# Patient Record
Sex: MALE | Race: WHITE | NOT HISPANIC OR LATINO | Employment: OTHER | URBAN - METROPOLITAN AREA
[De-identification: names, ages, dates, MRNs, and addresses within clinical notes are randomized per-mention and may not be internally consistent; named-entity substitution may affect disease eponyms.]

---

## 2017-04-04 ENCOUNTER — HOSPITAL ENCOUNTER (EMERGENCY)
Facility: HOSPITAL | Age: 72
Discharge: HOME | End: 2017-04-04
Attending: EMERGENCY MEDICINE
Payer: MEDICARE

## 2017-04-04 VITALS
HEART RATE: 99 BPM | HEIGHT: 70 IN | SYSTOLIC BLOOD PRESSURE: 146 MMHG | WEIGHT: 205 LBS | BODY MASS INDEX: 29.35 KG/M2 | RESPIRATION RATE: 15 BRPM | DIASTOLIC BLOOD PRESSURE: 85 MMHG | TEMPERATURE: 98 F | OXYGEN SATURATION: 95 %

## 2017-04-04 DIAGNOSIS — R06.6 HICCUPS: Primary | ICD-10-CM

## 2017-04-04 LAB
ALBUMIN SERPL BCP-MCNC: 3.6 G/DL
ALP SERPL-CCNC: 48 U/L
ALT SERPL W/O P-5'-P-CCNC: 28 U/L
ANION GAP SERPL CALC-SCNC: 12 MMOL/L
AST SERPL-CCNC: 20 U/L
BASOPHILS # BLD AUTO: 0.01 K/UL
BASOPHILS NFR BLD: 0.2 %
BILIRUB SERPL-MCNC: 0.7 MG/DL
BILIRUB UR QL STRIP: NEGATIVE
BNP SERPL-MCNC: 213 PG/ML
BUN SERPL-MCNC: 8 MG/DL
CALCIUM SERPL-MCNC: 9.7 MG/DL
CHLORIDE SERPL-SCNC: 100 MMOL/L
CK SERPL-CCNC: 151 U/L
CLARITY UR REFRACT.AUTO: CLEAR
CO2 SERPL-SCNC: 25 MMOL/L
COLOR UR AUTO: YELLOW
CREAT SERPL-MCNC: 1 MG/DL
DIFFERENTIAL METHOD: ABNORMAL
EOSINOPHIL # BLD AUTO: 0.2 K/UL
EOSINOPHIL NFR BLD: 2.8 %
ERYTHROCYTE [DISTWIDTH] IN BLOOD BY AUTOMATED COUNT: 13.6 %
EST. GFR  (AFRICAN AMERICAN): >60 ML/MIN/1.73 M^2
EST. GFR  (NON AFRICAN AMERICAN): >60 ML/MIN/1.73 M^2
GLUCOSE SERPL-MCNC: 169 MG/DL
GLUCOSE UR QL STRIP: NEGATIVE
HCT VFR BLD AUTO: 42.7 %
HGB BLD-MCNC: 14 G/DL
HGB UR QL STRIP: NEGATIVE
KETONES UR QL STRIP: NEGATIVE
LEUKOCYTE ESTERASE UR QL STRIP: NEGATIVE
LYMPHOCYTES # BLD AUTO: 0.5 K/UL
LYMPHOCYTES NFR BLD: 9.8 %
MCH RBC QN AUTO: 29.9 PG
MCHC RBC AUTO-ENTMCNC: 32.8 %
MCV RBC AUTO: 91 FL
MONOCYTES # BLD AUTO: 0.6 K/UL
MONOCYTES NFR BLD: 10.4 %
NEUTROPHILS # BLD AUTO: 4.2 K/UL
NEUTROPHILS NFR BLD: 76.6 %
NITRITE UR QL STRIP: NEGATIVE
PH UR STRIP: 7 [PH] (ref 5–8)
PLATELET # BLD AUTO: 118 K/UL
PMV BLD AUTO: 12.1 FL
POTASSIUM SERPL-SCNC: 4 MMOL/L
PROT SERPL-MCNC: 7 G/DL
PROT UR QL STRIP: NEGATIVE
RBC # BLD AUTO: 4.69 M/UL
SODIUM SERPL-SCNC: 137 MMOL/L
SP GR UR STRIP: <=1.005 (ref 1–1.03)
TROPONIN I SERPL DL<=0.01 NG/ML-MCNC: <0.006 NG/ML
URN SPEC COLLECT METH UR: ABNORMAL
UROBILINOGEN UR STRIP-ACNC: ABNORMAL EU/DL
WBC # BLD AUTO: 5.41 K/UL

## 2017-04-04 PROCEDURE — 25000003 PHARM REV CODE 250: Performed by: EMERGENCY MEDICINE

## 2017-04-04 PROCEDURE — 63600175 PHARM REV CODE 636 W HCPCS: Performed by: EMERGENCY MEDICINE

## 2017-04-04 PROCEDURE — 83880 ASSAY OF NATRIURETIC PEPTIDE: CPT

## 2017-04-04 PROCEDURE — 84484 ASSAY OF TROPONIN QUANT: CPT

## 2017-04-04 PROCEDURE — 85025 COMPLETE CBC W/AUTO DIFF WBC: CPT

## 2017-04-04 PROCEDURE — 93010 ELECTROCARDIOGRAM REPORT: CPT | Mod: ,,, | Performed by: NUCLEAR MEDICINE

## 2017-04-04 PROCEDURE — 82550 ASSAY OF CK (CPK): CPT

## 2017-04-04 PROCEDURE — 96374 THER/PROPH/DIAG INJ IV PUSH: CPT

## 2017-04-04 PROCEDURE — 80053 COMPREHEN METABOLIC PANEL: CPT

## 2017-04-04 PROCEDURE — 99284 EMERGENCY DEPT VISIT MOD MDM: CPT | Mod: 25

## 2017-04-04 PROCEDURE — 99900035 HC TECH TIME PER 15 MIN (STAT): Performed by: GENERAL PRACTICE

## 2017-04-04 PROCEDURE — 93005 ELECTROCARDIOGRAM TRACING: CPT | Performed by: GENERAL PRACTICE

## 2017-04-04 PROCEDURE — 81003 URINALYSIS AUTO W/O SCOPE: CPT

## 2017-04-04 RX ORDER — TAMSULOSIN HYDROCHLORIDE 0.4 MG/1
0.4 CAPSULE ORAL DAILY
COMMUNITY
Start: 2017-04-01 | End: 2018-06-02

## 2017-04-04 RX ORDER — CHLORPROMAZINE HYDROCHLORIDE 25 MG/1
25 TABLET, FILM COATED ORAL
Status: COMPLETED | OUTPATIENT
Start: 2017-04-04 | End: 2017-04-04

## 2017-04-04 RX ORDER — METOCLOPRAMIDE HYDROCHLORIDE 5 MG/ML
5 INJECTION INTRAMUSCULAR; INTRAVENOUS
Status: COMPLETED | OUTPATIENT
Start: 2017-04-04 | End: 2017-04-04

## 2017-04-04 RX ORDER — CHLORPROMAZINE HYDROCHLORIDE 10 MG/1
10 TABLET, FILM COATED ORAL 3 TIMES DAILY
Qty: 30 TABLET | Refills: 0 | Status: SHIPPED | OUTPATIENT
Start: 2017-04-04 | End: 2017-04-06

## 2017-04-04 RX ORDER — HYDROCODONE BITARTRATE AND ACETAMINOPHEN 7.5; 325 MG/1; MG/1
1 TABLET ORAL EVERY 6 HOURS PRN
COMMUNITY
Start: 2017-04-01 | End: 2017-04-06

## 2017-04-04 RX ORDER — FAMOTIDINE 20 MG/1
20 TABLET, FILM COATED ORAL DAILY
COMMUNITY

## 2017-04-04 RX ORDER — IPRATROPIUM BROMIDE AND ALBUTEROL SULFATE 2.5; .5 MG/3ML; MG/3ML
3 SOLUTION RESPIRATORY (INHALATION) EVERY 6 HOURS PRN
Status: ON HOLD | COMMUNITY
End: 2018-06-18 | Stop reason: CLARIF

## 2017-04-04 RX ORDER — METHYLDOPA 250 MG/1
250 TABLET, FILM COATED ORAL EVERY 12 HOURS
COMMUNITY

## 2017-04-04 RX ORDER — BUDESONIDE AND FORMOTEROL FUMARATE DIHYDRATE 160; 4.5 UG/1; UG/1
2 AEROSOL RESPIRATORY (INHALATION) EVERY 12 HOURS
COMMUNITY

## 2017-04-04 RX ADMIN — CHLORPROMAZINE HYDROCHLORIDE 25 MG: 25 TABLET, SUGAR COATED ORAL at 01:04

## 2017-04-04 RX ADMIN — METOCLOPRAMIDE 5 MG: 5 INJECTION, SOLUTION INTRAMUSCULAR; INTRAVENOUS at 02:04

## 2017-04-04 NOTE — ED NOTES
Pt is feeling better & feels comfortable being discharged at this time. Dr. Guerra is aware of vital signs & states ok for d/c. Pt is stable, in NAD, RR equal & unlabored, VSS, denies pain. Pt & spouse deny any further needs, questions, concerns or complaints. Will d/c per MD order.

## 2017-04-04 NOTE — DISCHARGE INSTRUCTIONS
Hiccups  The diaphragm is a dome-shaped muscle located at the bottom of the chest. It is the main muscle used for breathing. When you inhale, it pulls down to draw air into the lungs. When you exhale, the muscle pushes up to push air out of the lungs.  A hiccup is a spasm of the diaphragm muscle. This causes you to quickly inhale air, causing the hiccup sound. This may occur after eating or drinking too quickly or too much or having an irritation in the stomach or throat. It may also occur when feeling nervous or excited. Often hiccups seem to happen for no clear reason.  In most cases, hiccups are not serious. They last just a few minutes, and often go away without any treatment. There are many home remedies for treating hiccups and sometimes they work. These include:  · Holding your breath while counting to 10  · Drinking a glass of water quickly  · Rubbing the back of your tongue with a finger (just short of gagging)  · Making yourself sneeze  · Putting sugar on the back of your tongue  If the hiccups continue, medicine may be needed.  In rare cases hiccups may last for days or weeks, but this is very unusual. When this occurs, it is often the sign of another medical problem. In these cases, tests may be done to help find the cause.  Home care  · If your healthcare provider has prescribed medicine, be sure to take it as directed.  · Try the home remedies mentioned above. If using sugar, place ½ teaspoon of dry sugar and let it dissolve on the back of your tongue. If needed, repeat this process 3 times at 2-minute intervals. People who have diabetes should not use this home remedy.  Follow-up care  Follow up with your healthcare provider, or as directed. If testing was done, youll be told the results and whether there are any new findings that affect your care.  When to seek medical advice  Call your healthcare provider right away if any of these occur:  · Hiccups continue for more than 3 hours  · Hiccups  affect sleeping or keep you from eating  · Abdominal pain  Call 911  Call 911 right away if any of these occur:  · Trouble breathing or swallowing  · Unusually fast heart rate  · Fainting  · Vomiting blood  Date Last Reviewed: 6/22/2015  © 8704-6752 [employer]. [address] [place] [address]. All rights reserved. This information is not intended as a substitute for professional medical care. Always follow your healthcare professional's instructions.

## 2017-04-04 NOTE — ED PROVIDER NOTES
Encounter Date: 4/4/2017       History     Chief Complaint   Patient presents with    Shortness of Breath     Pt reports having outpt hernia repair on 3/31. States having hard time catching his breath since then. HX COPD, home treatments not helping.      Review of patient's allergies indicates:   Allergen Reactions    Losartan-hydrochlorothiazide Shortness Of Breath     Angioedema    Ace inhibitors      Facial swelling    Amlodipine      swelling    Azithromycin      diarrhea    Nebivolol      Patient is a 71 y.o. male presenting with the following complaint: shortness of breath. The history is provided by the patient.   Shortness of Breath   This is a new problem. The average episode lasts 3 days. The problem occurs frequently.The problem has not changed since onset.Pertinent negatives include no fever, no sore throat, no cough, no sputum production, no hemoptysis, no wheezing, no PND, no orthopnea, no chest pain, no abdominal pain, no rash, no leg pain, no leg swelling and no claudication. The problem's precipitants include medical treatment. He has tried nothing for the symptoms. Associated medical issues include COPD.     Past Medical History:   Diagnosis Date    Angio-edema     Dougherty esophagus     COPD (chronic obstructive pulmonary disease)     GERD (gastroesophageal reflux disease)     Hyperlipemia     Hypertension     PLMD (periodic limb movement disorder)     Sensorineural deafness     Stroke     TIA (transient ischemic attack)      Past Surgical History:   Procedure Laterality Date    CYST REMOVAL      right facial cheek     HERNIA REPAIR       History reviewed. No pertinent family history.  Social History   Substance Use Topics    Smoking status: Former Smoker     Packs/day: 2.00     Years: 35.00     Types: Cigarettes     Quit date: 2/18/1978    Smokeless tobacco: Former User    Alcohol use No     Review of Systems   Constitutional: Negative for fever.   HENT: Negative for sore  throat.    Respiratory: Positive for shortness of breath. Negative for cough, hemoptysis, sputum production and wheezing.    Cardiovascular: Negative for chest pain, orthopnea, claudication, leg swelling and PND.   Gastrointestinal: Negative for abdominal pain and nausea.   Genitourinary: Negative for dysuria.   Musculoskeletal: Negative for back pain.   Skin: Negative for rash.   Neurological: Negative for weakness.   Hematological: Does not bruise/bleed easily.       Physical Exam   Initial Vitals   BP Pulse Resp Temp SpO2   04/04/17 1256 04/04/17 1256 04/04/17 1256 04/04/17 1256 04/04/17 1256   161/82 101 20 97.8 °F (36.6 °C) 95 %     Physical Exam    Constitutional: He appears well-developed and well-nourished. No distress.   HENT:   Head: Normocephalic and atraumatic.   Eyes: Conjunctivae are normal. Pupils are equal, round, and reactive to light.   Neck: Normal range of motion. Neck supple.   Cardiovascular: Normal rate, regular rhythm and normal heart sounds.   Pulmonary/Chest: Breath sounds normal. He has no wheezes. He has no rhonchi. He has no rales.   Abdominal: Soft. Bowel sounds are normal. He exhibits no distension and no fluid wave. There is no tenderness.       Musculoskeletal: Normal range of motion.   Neurological: He is alert and oriented to person, place, and time. No cranial nerve deficit.   Skin: Skin is warm and dry.   Psychiatric: He has a normal mood and affect.         ED Course   Procedures  Labs Reviewed   CBC W/ AUTO DIFFERENTIAL - Abnormal; Notable for the following:        Result Value    Platelets 118 (*)     Lymph # 0.5 (*)     Gran% 76.6 (*)     Lymph% 9.8 (*)     All other components within normal limits   COMPREHENSIVE METABOLIC PANEL - Abnormal; Notable for the following:     Glucose 169 (*)     Alkaline Phosphatase 48 (*)     All other components within normal limits   URINALYSIS - Abnormal; Notable for the following:     Urobilinogen, UA 2.0-3.0 (*)     All other components  "within normal limits   B-TYPE NATRIURETIC PEPTIDE - Abnormal; Notable for the following:      (*)     All other components within normal limits   CK   TROPONIN I     EKG Readings: (Independently Interpreted)   Rhythm: Sinus Arrhythmia. Heart Rate: 91. Ectopy: No Ectopy. Conduction: LBBB.     ED Vital Signs:  Vitals:    04/04/17 1256 04/04/17 1305 04/04/17 1351   BP: (!) 161/82  138/78   Pulse: 101 97 100   Resp: 20  18   Temp: 97.8 °F (36.6 °C)     TempSrc: Oral     SpO2: 95%  95%   Weight: 93 kg (205 lb)     Height: 5' 10" (1.778 m)           Abnormal Lab Results:  Labs Reviewed   CBC W/ AUTO DIFFERENTIAL - Abnormal; Notable for the following:        Result Value    Platelets 118 (*)     Lymph # 0.5 (*)     Gran% 76.6 (*)     Lymph% 9.8 (*)     All other components within normal limits   COMPREHENSIVE METABOLIC PANEL - Abnormal; Notable for the following:     Glucose 169 (*)     Alkaline Phosphatase 48 (*)     All other components within normal limits   URINALYSIS - Abnormal; Notable for the following:     Urobilinogen, UA 2.0-3.0 (*)     All other components within normal limits   B-TYPE NATRIURETIC PEPTIDE - Abnormal; Notable for the following:      (*)     All other components within normal limits   CK   TROPONIN I          All Lab Results:  Results for orders placed or performed during the hospital encounter of 04/04/17   CBC auto differential   Result Value Ref Range    WBC 5.41 3.90 - 12.70 K/uL    RBC 4.69 4.60 - 6.20 M/uL    Hemoglobin 14.0 14.0 - 18.0 g/dL    Hematocrit 42.7 40.0 - 54.0 %    MCV 91 82 - 98 fL    MCH 29.9 27.0 - 31.0 pg    MCHC 32.8 32.0 - 36.0 %    RDW 13.6 11.5 - 14.5 %    Platelets 118 (L) 150 - 350 K/uL    MPV 12.1 9.2 - 12.9 fL    Gran # 4.2 1.8 - 7.7 K/uL    Lymph # 0.5 (L) 1.0 - 4.8 K/uL    Mono # 0.6 0.3 - 1.0 K/uL    Eos # 0.2 0.0 - 0.5 K/uL    Baso # 0.01 0.00 - 0.20 K/uL    Gran% 76.6 (H) 38.0 - 73.0 %    Lymph% 9.8 (L) 18.0 - 48.0 %    Mono% 10.4 4.0 - 15.0 %    " Eosinophil% 2.8 0.0 - 8.0 %    Basophil% 0.2 0.0 - 1.9 %    Differential Method Automated    Comprehensive metabolic panel   Result Value Ref Range    Sodium 137 136 - 145 mmol/L    Potassium 4.0 3.5 - 5.1 mmol/L    Chloride 100 95 - 110 mmol/L    CO2 25 23 - 29 mmol/L    Glucose 169 (H) 70 - 110 mg/dL    BUN, Bld 8 8 - 23 mg/dL    Creatinine 1.0 0.5 - 1.4 mg/dL    Calcium 9.7 8.7 - 10.5 mg/dL    Total Protein 7.0 6.0 - 8.4 g/dL    Albumin 3.6 3.5 - 5.2 g/dL    Total Bilirubin 0.7 0.1 - 1.0 mg/dL    Alkaline Phosphatase 48 (L) 55 - 135 U/L    AST 20 10 - 40 U/L    ALT 28 10 - 44 U/L    Anion Gap 12 8 - 16 mmol/L    eGFR if African American >60.0 >60 mL/min/1.73 m^2    eGFR if non African American >60.0 >60 mL/min/1.73 m^2   Urinalysis   Result Value Ref Range    Specimen UA Urine, Clean Catch     Color, UA Yellow Yellow, Straw, Essence    Appearance, UA Clear Clear    pH, UA 7.0 5.0 - 8.0    Specific Gravity, UA <=1.005 1.005 - 1.030    Protein, UA Negative Negative    Glucose, UA Negative Negative    Ketones, UA Negative Negative    Bilirubin (UA) Negative Negative    Occult Blood UA Negative Negative    Nitrite, UA Negative Negative    Urobilinogen, UA 2.0-3.0 (A) <2.0 EU/dL    Leukocytes, UA Negative Negative   Brain natriuretic peptide   Result Value Ref Range     (H) 0 - 99 pg/mL   CK   Result Value Ref Range     20 - 200 U/L   Troponin I   Result Value Ref Range    Troponin I <0.006 0.000 - 0.026 ng/mL           Imaging Results:  Imaging Results         X-Ray Chest AP Portable (Final result) Result time:  04/04/17 13:22:20    Final result by ARLENE Yeboah Sr., MD (04/04/17 13:22:20)    Impression:        1.  The lungs are clear.  2.  There is moderate cardiomegaly.  3.  The left costophrenic angle is not well seen.  A tiny left pleural effusion cannot be excluded.        Electronically signed by: ARLENE YEBOAH MD  Date:     04/04/17  Time:    13:22     Narrative:    One-view chest x-ray    Clinical  History: Shortness of breath    Finding: Comparison was made to a prior examination performed on 7/12/2016.  There is moderate cardiomegaly.  The lungs are clear. There is no pneumothorax.  The right costophrenic angle is sharp.  The left costophrenic angle is not well seen.                 The Emergency Provider reviewed the vital signs and test results, which are outlined above.    ED Discussions:  2:13 PM: Re-evaluated pt. Pt is resting comfortably and is in no acute distress.  Pt states that the hiccups are gone, and he feels much better.  No longer short of breath.  D/w pt all pertinent results. D/w pt any concerns expressed at this time. Answered all questions. Pt expresses understanding at this time.    2:43 PM: Reassessed pt at this time.  Pt states his condition has improved at this time.  Repeat abdominal exam - soft nontender, nondistended.   Discussed with pt all pertinent ED information and results. Discussed pt dx of hiccups and plan of tx. Gave pt all f/u and return to the ED instructions. All questions and concerns were addressed at this time. Pt expresses understanding of information and instructions, and is comfortable with plan to discharge. Pt is stable for discharge.                               ED Course     Clinical Impression:       ICD-10-CM ICD-9-CM   1. Hiccups R06.6 786.8         Disposition:   Disposition: Discharged  Condition: Stable       Phil Guerra MD  04/04/17 3407

## 2017-04-04 NOTE — ED NOTES
"72 y/o M presents to ED with c/o "hard time catching his breath since having surgery on Friday." He does admit to HX of COPD, but reports using rescue inhaler as well as home duo-neb treatments x2 with no relief.     LOC: The patient is awake, alert and aware of environment with an appropriate affect, the patient is oriented x 3 and speaking appropriately.  APPEARANCE: Patient resting comfortably and in no acute distress, patient is clean and well groomed, patient's clothing is properly fastened.  HEENT: Brief WNL  SKIN: Brief WNL except 3 laparoscopic incision sites to lower mid-line abdomen. Dressings C/D/I. Dressed with band-aid & steri-strips. No drainage noted.   MUSCULOSKELETAL: Brief WNL  RESPIRATORY: Brief WNL except pt reports being short of breath since Friday. BBS clear & equal to auscultation. O2 saturations 95-97% on RA. RR equal & unlabored. No distress noted. No use of accessory muscles.   CARDIAC: Brief WNL. Pt denies CP. 12 Lead EKG reveals sinus arrythmia. Pulses are strong & regular throughout.   GASTRO: Brief WNL except pt c/o mild constipation. States his first BM post-op was this AM. Abdomen is soft & non-distended. Bowel sounds audible, but hypoactive in all 4 quadrants. Pt denies nausea & vomiting. He does c/o incessant hiccoughs/belching that began last PM.   : Brief WNL  Peripheral Vasc: Brief WNL  NEURO: Brief WNL  PSYCH: Brief WNL    Pt & wife have been updated on plan of care and verbalize understanding. Bed is locked & in lowest position. Side rails up x2 & call bell is within pt reach. Pt & spouse have been instructed to call with any questions, concerns, needs or complaints. Will continue to monitor.   "

## 2017-04-04 NOTE — ED AVS SNAPSHOT
[facility]  [address] [address]  [place] [address]               Jose Clark Jr.   2017 12:47 PM   ED    Description:  Male : 1945   Department:  [facility]           Your Care was Coordinated By:     Provider Role From To    Phil Guerra MD Attending Provider 17 1251 --      Reason for Visit     Shortness of Breath           Diagnoses this Visit        Comments    Hiccups    -  Primary       ED Disposition     ED Disposition Condition Comment    Discharge             To Do List           Follow-up Information     Follow up with Rudolph Alves MD In 2 days.    Specialty:  Internal Medicine    Contact information:    [address] [facility]  [place] [address]  [phone]         These Medications        Disp Refills Start End    chlorproMAZINE (THORAZINE) 10 MG tablet 30 tablet 0 2017    Take 1 tablet (10 mg total) by mouth 3 (three) times daily. - Oral    Pharmacy: [facility] #[id number] - [place] - [address] Ph #: [phone]         [facility] On [facility]     [facility] Nurse Care Line -  Assistance  Unless otherwise directed by your provider, please contact [facility], our nurse care line that is available for  assistance.     Registered nurses in the [facility] provide: appointment scheduling, clinical advisement, health education, and other advisory services.  Call: [phone] (toll free)               Medications           Message regarding Medications     Verify the changes and/or additions to your medication regime listed below are the same as discussed with your clinician today.  If any of these changes or additions are incorrect, please notify your healthcare provider.        START taking these NEW medications        Refills    chlorproMAZINE (THORAZINE) 10 MG tablet 0    Sig: Take 1 tablet (10 mg total) by mouth 3 (three) times daily.    Class: Print     Route: Oral      These medications were administered today        Dose Freq    chlorproMAZINE tablet 25 mg 25 mg ED 1 Time    Sig: Take 1 tablet (25 mg total) by mouth ED 1 Time.    Class: Normal    Route: Oral    metoclopramide HCl injection 5 mg 5 mg ED 1 Time    Sig: Inject 1 mL (5 mg total) into the vein ED 1 Time.    Class: Normal    Route: Intravenous      STOP taking these medications     albuterol 90 mcg/actuation inhaler Inhale 2 puffs into the lungs every 6 (six) hours as needed.    budesonide-formoterol 80-4.5 mcg (SYMBICORT) 80-4.5 mcg/actuation HFAA Inhale 2 puffs into the lungs 2 (two) times daily.    diazepam (VALIUM) 5 MG tablet Take 1 tablet (5 mg total) by mouth every 6 (six) hours as needed for Anxiety (hiccups).    indapamide (LOZOL) 2.5 MG Tab Take 2.5 mg by mouth.           Verify that the below list of medications is an accurate representation of the medications you are currently taking.  If none reported, the list may be blank. If incorrect, please contact your healthcare provider. Carry this list with you in case of emergency.           Current Medications     albuterol sulfate 90 mcg/actuation AePB Inhale into the lungs. Rescue    albuterol-ipratropium 2.5mg-0.5mg/3mL (DUONEB) 0.5 mg-3 mg(2.5 mg base)/3 mL nebulizer solution Take 3 mLs by nebulization every 6 (six) hours as needed for Wheezing. Rescue    aspirin (ECOTRIN) 81 MG EC tablet Take 81 mg by mouth once daily.    budesonide-formoterol 160-4.5 mcg (SYMBICORT) 160-4.5 mcg/actuation HFAA Inhale 2 puffs into the lungs every 12 (twelve) hours. Controller    ergocalciferol, vitamin D2, 400 unit Tab Take 1 tablet by mouth once daily.    esomeprazole (NEXIUM) 40 MG capsule Take 40 mg by mouth.    famotidine (PEPCID) 20 MG tablet Take 20 mg by mouth once daily.    hydrocodone-acetaminophen 7.5-325mg (NORCO) 7.5-325 mg per tablet Take 1 tablet by mouth every 6 (six) hours as needed.    methyldopa (ALDOMET) 250 MG tablet Take 250 mg by mouth  "every 12 (twelve) hours.    simvastatin (ZOCOR) 40 MG tablet Take 40 mg by mouth every evening.    tamsulosin (FLOMAX) 0.4 mg Cp24 Take 0.4 mg by mouth once daily.    terazosin (HYTRIN) 1 MG capsule Take 1 mg by mouth every evening.    chlorproMAZINE (THORAZINE) 10 MG tablet Take 1 tablet (10 mg total) by mouth 3 (three) times daily.    chlorproMAZINE tablet 25 mg Take 1 tablet (25 mg total) by mouth ED 1 Time.    epinephrine (EPIPEN) 0.3 mg/0.3 mL (1:1,000) AtIn Inject 0.3 mLs (0.3 mg total) into the muscle as needed.           Clinical Reference Information           Your Vitals Were     BP Pulse Temp Resp Height Weight    138/78 (BP Location: Left arm, Patient Position: Lying, BP Method: Automatic) 100 97.8 °F (36.6 °C) (Oral) 18 5' 10" (1.778 m) 93 kg (205 lb)    SpO2 BMI             95% 29.41 kg/m2         Allergies as of 4/4/2017        Reactions    Losartan-hydrochlorothiazide Shortness Of Breath    Angioedema    Ace Inhibitors     Facial swelling    Amlodipine     swelling    Azithromycin     diarrhea    Nebivolol       Immunizations Administered on Date of Encounter - 4/4/2017     None      ED Micro, Lab, POCT     Start Ordered       Status Ordering Provider    04/04/17 1253 04/04/17 1254  CBC auto differential  STAT      Final result     04/04/17 1253 04/04/17 1254  Comprehensive metabolic panel  STAT      Final result     04/04/17 1253 04/04/17 1254  Urinalysis  STAT      Final result     04/04/17 1253 04/04/17 1254  Brain natriuretic peptide  STAT      Final result     04/04/17 1253 04/04/17 1254  CK  STAT      Final result     04/04/17 1253 04/04/17 1254  Troponin I  STAT      Final result       ED Imaging Orders     Start Ordered       Status Ordering Provider    04/04/17 1253 04/04/17 1254  X-Ray Chest AP Portable  1 time imaging      Final result         Discharge Instructions         Hiccups  The diaphragm is a dome-shaped muscle located at the bottom of the chest. It is the main muscle used for " breathing. When you inhale, it pulls down to draw air into the lungs. When you exhale, the muscle pushes up to push air out of the lungs.  A hiccup is a spasm of the diaphragm muscle. This causes you to quickly inhale air, causing the hiccup sound. This may occur after eating or drinking too quickly or too much or having an irritation in the stomach or throat. It may also occur when feeling nervous or excited. Often hiccups seem to happen for no clear reason.  In most cases, hiccups are not serious. They last just a few minutes, and often go away without any treatment. There are many home remedies for treating hiccups and sometimes they work. These include:  · Holding your breath while counting to 10  · Drinking a glass of water quickly  · Rubbing the back of your tongue with a finger (just short of gagging)  · Making yourself sneeze  · Putting sugar on the back of your tongue  If the hiccups continue, medicine may be needed.  In rare cases hiccups may last for days or weeks, but this is very unusual. When this occurs, it is often the sign of another medical problem. In these cases, tests may be done to help find the cause.  Home care  · If your healthcare provider has prescribed medicine, be sure to take it as directed.  · Try the home remedies mentioned above. If using sugar, place ½ teaspoon of dry sugar and let it dissolve on the back of your tongue. If needed, repeat this process 3 times at 2-minute intervals. People who have diabetes should not use this home remedy.  Follow-up care  Follow up with your healthcare provider, or as directed. If testing was done, youll be told the results and whether there are any new findings that affect your care.  When to seek medical advice  Call your healthcare provider right away if any of these occur:  · Hiccups continue for more than 3 hours  · Hiccups affect sleeping or keep you from eating  · Abdominal pain  Call 911  Call 911 right away if any of these  occur:  · Trouble breathing or swallowing  · Unusually fast heart rate  · Fainting  · Vomiting blood  Date Last Reviewed: 6/22/2015  © 9787-1214 [employer]. [address] [place] [address], [place] [address]. All rights reserved. This information is not intended as a substitute for professional medical care. Always follow your healthcare professional's instructions.          [facility] Sign-Up     Activating your [facility] account is as easy as 1-2-3!     1) Visit [email or web address], select Sign Up Now, enter this activation code and your date of birth, then select Next.  [email or web address]  Expires: 5/19/2017  2:41 PM      2) Create a username and password to use when you visit [facility] in the future and select a security question in case you lose your password and select Next.    3) Enter your e-mail address and click Sign Up!    Additional Information  If you have questions, please e-mail [email or web address] or call [phone] to talk to our [facility] staff. Remember, [facility] is NOT to be used for urgent needs. For medical emergencies, dial 911.         Smoking Cessation     If you would like to quit smoking:   You may be eligible for free services if you are a [place] resident and started smoking cigarettes before September 1, 1988.  Call the Smoking Cessation Trust ([facility]) toll free at (516) 456-3779 or (525) 636-6069.   Call 9-800-QUIT-NOW if you do not meet the above criteria.   Contact us via email: [email or web address]   View our website for more information: [email or web address]         [facility] complies with applicable Federal civil rights laws and does not discriminate on the basis of race, color, national origin, age, disability, or sex.        Language Assistance Services     ATTENTION: Language assistance services are available, free of charge. Please call [phone].      ATENCIÓN: Si habla español, tiene a nice disposición servicios gratuitos de asistencia  lingüística. [facility] [phone].     JAZMYN Ý: N?u b?n nói Ti?ng Vi?t, có các d?ch v? h? tr? ngôn ng? mi?n phí dành cho b?n. G?i s? [phone].

## 2017-04-04 NOTE — ED NOTES
Hiccoughs have resolved. Pt is resting comfortably on stretcher. Reports breathing is also better.

## 2017-04-06 ENCOUNTER — HOSPITAL ENCOUNTER (EMERGENCY)
Facility: HOSPITAL | Age: 72
Discharge: HOME | End: 2017-04-06
Payer: MEDICARE

## 2017-04-06 VITALS
BODY MASS INDEX: 29.35 KG/M2 | DIASTOLIC BLOOD PRESSURE: 96 MMHG | SYSTOLIC BLOOD PRESSURE: 163 MMHG | OXYGEN SATURATION: 95 % | HEART RATE: 83 BPM | RESPIRATION RATE: 16 BRPM | HEIGHT: 70 IN | TEMPERATURE: 98 F | WEIGHT: 205 LBS

## 2017-04-06 DIAGNOSIS — R06.6 INTRACTABLE HICCUPS: ICD-10-CM

## 2017-04-06 PROCEDURE — 93005 ELECTROCARDIOGRAM TRACING: CPT

## 2017-04-06 PROCEDURE — 25000003 PHARM REV CODE 250: Performed by: PHYSICIAN ASSISTANT

## 2017-04-06 PROCEDURE — 93010 ELECTROCARDIOGRAM REPORT: CPT | Mod: ,,, | Performed by: NUCLEAR MEDICINE

## 2017-04-06 PROCEDURE — 99283 EMERGENCY DEPT VISIT LOW MDM: CPT

## 2017-04-06 PROCEDURE — 99900035 HC TECH TIME PER 15 MIN (STAT)

## 2017-04-06 RX ORDER — DIAZEPAM 5 MG/1
5 TABLET ORAL
Status: COMPLETED | OUTPATIENT
Start: 2017-04-06 | End: 2017-04-06

## 2017-04-06 RX ORDER — CHLORPROMAZINE HYDROCHLORIDE 10 MG/1
10 TABLET, FILM COATED ORAL 3 TIMES DAILY
Qty: 30 TABLET | Refills: 0 | Status: SHIPPED | OUTPATIENT
Start: 2017-04-06 | End: 2017-04-08

## 2017-04-06 RX ORDER — CHLORPROMAZINE HYDROCHLORIDE 25 MG/1
25 TABLET, FILM COATED ORAL
Status: COMPLETED | OUTPATIENT
Start: 2017-04-06 | End: 2017-04-06

## 2017-04-06 RX ADMIN — CHLORPROMAZINE HYDROCHLORIDE 25 MG: 25 TABLET, SUGAR COATED ORAL at 01:04

## 2017-04-06 RX ADMIN — LIDOCAINE HYDROCHLORIDE 50 ML: 20 SOLUTION ORAL; TOPICAL at 01:04

## 2017-04-06 RX ADMIN — DIAZEPAM 5 MG: 5 TABLET ORAL at 02:04

## 2017-04-06 NOTE — ED PROVIDER NOTES
"Encounter Date: 4/6/2017       History     Chief Complaint   Patient presents with    Hiccups     "Same thing I was here for on Monday-I have the hiccups and sometimes it takes my breath away"     Review of patient's allergies indicates:   Allergen Reactions    Losartan-hydrochlorothiazide Shortness Of Breath     Angioedema    Ace inhibitors      Facial swelling    Amlodipine      swelling    Azithromycin      diarrhea    Nebivolol      HPI Comments: Pt reports to ED c/o intractable hiccups. Pt says hiccups have been ongoing for several days now. Associated sxs include difficulty catching his breath occasionally and pt has a hx of COPD. Pt was seen here 2 days ago for the same issue. Lab work, imaging, and EKG were all found to be negative. Pt was prescribed chlorpromazine, but says the pharmacy did not have any so he got metoclopramide instead.  Pt says medications have not been working and he can't stop hiccupping and can't get any sleep.  Pt reports the last time this happened to him was a year ago.  The only change since then is that patient had surgery for hernia repair about 2 weeks ago. Pt was prescribed Norco and tamsulosin after procedure, and his wife thinks one of those meds may be causing the hiccups. Wife has been trying to contact surgeons office, but has not yet heard back from provider.  Pt reports no alleviating factors.  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  Pt denies any fever, nausea, vomiting, CP, back pain, abdominal pain, or any other associated sxs.      The history is provided by the patient.     Past Medical History:   Diagnosis Date    Angio-edema     Dougherty esophagus     COPD (chronic obstructive pulmonary disease)     GERD (gastroesophageal reflux disease)     Hyperlipemia     Hypertension     PLMD (periodic limb movement disorder)     Sensorineural deafness     Stroke     TIA (transient ischemic attack)      Past " Surgical History:   Procedure Laterality Date    CYST REMOVAL      right facial cheek     HERNIA REPAIR       History reviewed. No pertinent family history.  Social History   Substance Use Topics    Smoking status: Former Smoker     Packs/day: 2.00     Years: 35.00     Types: Cigarettes     Quit date: 2/18/1978    Smokeless tobacco: Former User    Alcohol use No     Review of Systems   Constitutional: Negative for fever.   HENT: Negative for sore throat.    Respiratory: Negative for shortness of breath.    Cardiovascular: Negative for chest pain.   Gastrointestinal: Negative for nausea.   Genitourinary: Negative for dysuria.   Musculoskeletal: Negative for back pain.   Skin: Negative for rash.   Neurological: Negative for weakness.   Hematological: Does not bruise/bleed easily.   All other systems reviewed and are negative.      Physical Exam   Initial Vitals   BP Pulse Resp Temp SpO2   04/06/17 1234 04/06/17 1234 04/06/17 1234 04/06/17 1234 04/06/17 1234   183/103 94 20 98.3 °F (36.8 °C) 97 %     Physical Exam    Nursing note and vitals reviewed.  Constitutional: He appears well-developed and well-nourished.   HENT:   Head: Normocephalic and atraumatic.   Eyes: Conjunctivae and EOM are normal. Pupils are equal, round, and reactive to light.   Neck: Normal range of motion. Neck supple.   Cardiovascular: Normal rate and regular rhythm.   Pulmonary/Chest: No respiratory distress. He has no wheezes. He has no rhonchi. He has no rales. He exhibits no tenderness.   Abdominal: Soft. Bowel sounds are normal.   Musculoskeletal: Normal range of motion.   Neurological: He is alert and oriented to person, place, and time. He has normal strength and normal reflexes.         ED Course   Procedures  Labs Reviewed - No data to display  EKG Readings: (Independently Interpreted)   Heart Rate: 89. Other Impression: right bundle branch block   Normal sinus rhythm                              ED Course     Clinical Impression:    The encounter diagnosis was Intractable hiccups.          ALYSSA Wilkinson  04/06/17 3126

## 2017-04-06 NOTE — DISCHARGE INSTRUCTIONS
Hiccups  The diaphragm is a dome-shaped muscle located at the bottom of the chest. It is the main muscle used for breathing. When you inhale, it pulls down to draw air into the lungs. When you exhale, the muscle pushes up to push air out of the lungs.  A hiccup is a spasm of the diaphragm muscle. This causes you to quickly inhale air, causing the hiccup sound. This may occur after eating or drinking too quickly or too much or having an irritation in the stomach or throat. It may also occur when feeling nervous or excited. Often hiccups seem to happen for no clear reason.  In most cases, hiccups are not serious. They last just a few minutes, and often go away without any treatment. There are many home remedies for treating hiccups and sometimes they work. These include:  · Holding your breath while counting to 10  · Drinking a glass of water quickly  · Rubbing the back of your tongue with a finger (just short of gagging)  · Making yourself sneeze  · Putting sugar on the back of your tongue  If the hiccups continue, medicine may be needed.  In rare cases hiccups may last for days or weeks, but this is very unusual. When this occurs, it is often the sign of another medical problem. In these cases, tests may be done to help find the cause.  Home care  · If your healthcare provider has prescribed medicine, be sure to take it as directed.  · Try the home remedies mentioned above. If using sugar, place ½ teaspoon of dry sugar and let it dissolve on the back of your tongue. If needed, repeat this process 3 times at 2-minute intervals. People who have diabetes should not use this home remedy.  Follow-up care  Follow up with your healthcare provider, or as directed. If testing was done, youll be told the results and whether there are any new findings that affect your care.  When to seek medical advice  Call your healthcare provider right away if any of these occur:  · Hiccups continue for more than 3 hours  · Hiccups  affect sleeping or keep you from eating  · Abdominal pain  Call 911  Call 911 right away if any of these occur:  · Trouble breathing or swallowing  · Unusually fast heart rate  · Fainting  · Vomiting blood  Date Last Reviewed: 6/22/2015  © 3204-5870 [employer]. [address] [place] [address]. All rights reserved. This information is not intended as a substitute for professional medical care. Always follow your healthcare professional's instructions.

## 2017-04-06 NOTE — ED NOTES
Pt's prescription called in to [facility] pharmacy on [address] in [place]. Pharmacist states they do not have medication in stock but will order for pt. Informed pt & spouse that medication will be ordered for them and per [facility], they can  meds tomorrow afternoon.

## 2017-04-06 NOTE — ED NOTES
PA aware of pt's vital signs & states ok for discharge at this time. Pt is stable, in NAD, RR equal & unlabored, denies pain. Pt & spouse deny any further needs, questions, concerns or complaints. Will d/c per order.

## 2017-04-06 NOTE — ED AVS SNAPSHOT
[facility]  [address]  [place] [address]               Jose Clark Jr.   2017  1:04 PM   ED    Description:  Male : 1945   Department:  [facility]           Your Care was Coordinated By:     Provider Role From To    ALYSSA Wilkinson Physician Assistant [phone] --      Reason for Visit     Hiccups           Diagnoses this Visit        Comments    Intractable hiccups           ED Disposition     None           To Do List           Follow-up Information     Follow up with Rudolph Alves MD In 2 days.    Specialty:  Internal Medicine    Why:  As needed, If symptoms worsen return to ED     Contact information:    [address] [facility] [place] [facility]  [place] [address]  [phone]         These Medications        Disp Refills Start End    chlorproMAZINE (THORAZINE) 10 MG tablet 30 tablet 0 2017    Take 1 tablet (10 mg total) by mouth 3 (three) times daily. - Oral    Pharmacy: [facility]/pharmacy #[id number] - [place], [place] - [address] [address] Ph #: [phone]         [facility]     [facility] Nurse Care Line -  Assistance  Unless otherwise directed by your provider, please contact [facility], our nurse care line that is available for  assistance.     Registered nurses in the [facility] provide: appointment scheduling, clinical advisement, health education, and other advisory services.  Call: [phone] (toll free)               Medications           Message regarding Medications     Verify the changes and/or additions to your medication regime listed below are the same as discussed with your clinician today.  If any of these changes or additions are incorrect, please notify your healthcare provider.        These medications were administered today        Dose Freq    GI cocktail (mylanta 30 mL, lidocaine 2 % viscous 10 mL, dicyclomine 10 mL) 50 mL  ED 1 Time    Sig: Take by mouth ED 1  Time.    Class: Normal    Route: Oral    Cosign for Ordering: Required by George Whitaker MD    chlorproMAZINE tablet 25 mg 25 mg ED 1 Time    Sig: Take 1 tablet (25 mg total) by mouth ED 1 Time.    Class: Normal    Route: Oral    Cosign for Ordering: Required by George Whitaker MD           Verify that the below list of medications is an accurate representation of the medications you are currently taking.  If none reported, the list may be blank. If incorrect, please contact your healthcare provider. Carry this list with you in case of emergency.           Current Medications     albuterol sulfate 90 mcg/actuation AePB Inhale into the lungs. Rescue    albuterol-ipratropium 2.5mg-0.5mg/3mL (DUONEB) 0.5 mg-3 mg(2.5 mg base)/3 mL nebulizer solution Take 3 mLs by nebulization every 6 (six) hours as needed for Wheezing. Rescue    aspirin (ECOTRIN) 81 MG EC tablet Take 81 mg by mouth once daily.    budesonide-formoterol 160-4.5 mcg (SYMBICORT) 160-4.5 mcg/actuation HFAA Inhale 2 puffs into the lungs every 12 (twelve) hours. Controller    chlorproMAZINE (THORAZINE) 10 MG tablet Take 1 tablet (10 mg total) by mouth 3 (three) times daily.    chlorproMAZINE tablet 25 mg Take 1 tablet (25 mg total) by mouth ED 1 Time.    epinephrine (EPIPEN) 0.3 mg/0.3 mL (1:1,000) AtIn Inject 0.3 mLs (0.3 mg total) into the muscle as needed.    ergocalciferol, vitamin D2, 400 unit Tab Take 1 tablet by mouth once daily.    esomeprazole (NEXIUM) 40 MG capsule Take 40 mg by mouth.    famotidine (PEPCID) 20 MG tablet Take 20 mg by mouth once daily.    hydrocodone-acetaminophen 7.5-325mg (NORCO) 7.5-325 mg per tablet Take 1 tablet by mouth every 6 (six) hours as needed.    methyldopa (ALDOMET) 250 MG tablet Take 250 mg by mouth every 12 (twelve) hours.    simvastatin (ZOCOR) 40 MG tablet Take 40 mg by mouth every evening.    tamsulosin (FLOMAX) 0.4 mg Cp24 Take 0.4 mg by mouth once daily.    terazosin (HYTRIN) 1 MG capsule Take 1 mg by  "mouth every evening.           Clinical Reference Information           Your Vitals Were     BP Pulse Temp Resp Height Weight    183/103 (BP Location: Right arm) 94 98.3 °F (36.8 °C) (Oral) 20 5' 10" (1.778 m) 93 kg (205 lb)    SpO2 BMI             97% 29.41 kg/m2         Allergies as of 4/6/2017        Reactions    Losartan-hydrochlorothiazide Shortness Of Breath    Angioedema    Ace Inhibitors     Facial swelling    Amlodipine     swelling    Azithromycin     diarrhea    Nebivolol       Immunizations Administered on Date of Encounter - 4/6/2017     None      ED Micro, Lab, POCT     None      ED Imaging Orders     None        Discharge Instructions         Hiccups  The diaphragm is a dome-shaped muscle located at the bottom of the chest. It is the main muscle used for breathing. When you inhale, it pulls down to draw air into the lungs. When you exhale, the muscle pushes up to push air out of the lungs.  A hiccup is a spasm of the diaphragm muscle. This causes you to quickly inhale air, causing the hiccup sound. This may occur after eating or drinking too quickly or too much or having an irritation in the stomach or throat. It may also occur when feeling nervous or excited. Often hiccups seem to happen for no clear reason.  In most cases, hiccups are not serious. They last just a few minutes, and often go away without any treatment. There are many home remedies for treating hiccups and sometimes they work. These include:  · Holding your breath while counting to 10  · Drinking a glass of water quickly  · Rubbing the back of your tongue with a finger (just short of gagging)  · Making yourself sneeze  · Putting sugar on the back of your tongue  If the hiccups continue, medicine may be needed.  In rare cases hiccups may last for days or weeks, but this is very unusual. When this occurs, it is often the sign of another medical problem. In these cases, tests may be done to help find the cause.  Home care  · If your " healthcare provider has prescribed medicine, be sure to take it as directed.  · Try the home remedies mentioned above. If using sugar, place ½ teaspoon of dry sugar and let it dissolve on the back of your tongue. If needed, repeat this process 3 times at 2-minute intervals. People who have diabetes should not use this home remedy.  Follow-up care  Follow up with your healthcare provider, or as directed. If testing was done, youll be told the results and whether there are any new findings that affect your care.  When to seek medical advice  Call your healthcare provider right away if any of these occur:  · Hiccups continue for more than 3 hours  · Hiccups affect sleeping or keep you from eating  · Abdominal pain  Call 911  Call 911 right away if any of these occur:  · Trouble breathing or swallowing  · Unusually fast heart rate  · Fainting  · Vomiting blood  Date Last Reviewed: 6/22/2015  © 5298-5472 [employer]. [address] [place] [address]. All rights reserved. This information is not intended as a substitute for professional medical care. Always follow your healthcare professional's instructions.          [facility] Sign-Up     Activating your [facility] account is as easy as 1-2-3!     1) Visit [email or web address], select Sign Up Now, enter this activation code and your date of birth, then select Next.  [email or web address]  Expires: 5/19/2017  2:41 PM      2) Create a username and password to use when you visit [facility] in the future and select a security question in case you lose your password and select Next.    3) Enter your e-mail address and click Sign Up!    Additional Information  If you have questions, please e-mail [email or web address] or call [phone] to talk to our [facility] staff. Remember, [facility] is NOT to be used for urgent needs. For medical emergencies, dial 911.         Smoking Cessation     If you would like to quit smoking:   You may be eligible for free services if  you are a [place] resident and started smoking cigarettes before September 1, 1988.  Call the Smoking Cessation Trust (SCT) toll free at (099) 152-8078 or (165) 048-0425.   Call 1-800-QUIT-NOW if you do not meet the above criteria.   Contact us via email: [email or web address]   View our website for more information: [email or web address]         [facility] complies with applicable Federal civil rights laws and does not discriminate on the basis of race, color, national origin, age, disability, or sex.        Language Assistance Services     ATTENTION: Language assistance services are available, free of charge. Please call [phone].      ATENCIÓN: Si habla ninoska, tiene a nice disposición servicios gratuitos de asistencia lingüística. Llame al [phone].     CHÚ Ý: N?u b?n nói Ti?ng Vi?t, có các d?ch v? h? tr? ngôn ng? mi?n phí dành cho b?n. G?i s? [phone].

## 2017-04-06 NOTE — ED NOTES
Pt reports that hiccups resolved for a little while, but upon entering room to discharge pt, he began with the hiccups again. PA notified & new orders to follow.

## 2017-04-08 ENCOUNTER — HOSPITAL ENCOUNTER (EMERGENCY)
Facility: HOSPITAL | Age: 72
Discharge: HOME | End: 2017-04-08
Attending: EMERGENCY MEDICINE
Payer: MEDICARE

## 2017-04-08 VITALS
TEMPERATURE: 98 F | DIASTOLIC BLOOD PRESSURE: 86 MMHG | WEIGHT: 201 LBS | BODY MASS INDEX: 28.84 KG/M2 | OXYGEN SATURATION: 96 % | RESPIRATION RATE: 22 BRPM | SYSTOLIC BLOOD PRESSURE: 149 MMHG | HEART RATE: 84 BPM

## 2017-04-08 DIAGNOSIS — R06.6 INTRACTABLE HICCUPS: Primary | ICD-10-CM

## 2017-04-08 PROCEDURE — 25000003 PHARM REV CODE 250: Performed by: EMERGENCY MEDICINE

## 2017-04-08 PROCEDURE — 99283 EMERGENCY DEPT VISIT LOW MDM: CPT | Mod: 25

## 2017-04-08 PROCEDURE — 63600175 PHARM REV CODE 636 W HCPCS: Performed by: EMERGENCY MEDICINE

## 2017-04-08 PROCEDURE — 96372 THER/PROPH/DIAG INJ SC/IM: CPT

## 2017-04-08 RX ORDER — DIAZEPAM 10 MG/2ML
10 INJECTION INTRAMUSCULAR
Status: COMPLETED | OUTPATIENT
Start: 2017-04-08 | End: 2017-04-08

## 2017-04-08 RX ORDER — DIAZEPAM 5 MG/1
5 TABLET ORAL EVERY 6 HOURS PRN
Qty: 20 TABLET | Refills: 1 | Status: ON HOLD | OUTPATIENT
Start: 2017-04-08 | End: 2018-06-18 | Stop reason: HOSPADM

## 2017-04-08 RX ORDER — CHLORPROMAZINE HYDROCHLORIDE 50 MG/1
50 TABLET, FILM COATED ORAL 4 TIMES DAILY PRN
Qty: 20 TABLET | Refills: 1 | Status: SHIPPED | OUTPATIENT
Start: 2017-04-08 | End: 2018-04-08

## 2017-04-08 RX ORDER — CHLORPROMAZINE HYDROCHLORIDE 25 MG/1
50 TABLET, FILM COATED ORAL
Status: COMPLETED | OUTPATIENT
Start: 2017-04-08 | End: 2017-04-08

## 2017-04-08 RX ORDER — METOCLOPRAMIDE HYDROCHLORIDE 5 MG/ML
10 INJECTION INTRAMUSCULAR; INTRAVENOUS
Status: COMPLETED | OUTPATIENT
Start: 2017-04-08 | End: 2017-04-08

## 2017-04-08 RX ADMIN — DIAZEPAM 10 MG: 5 INJECTION, SOLUTION INTRAMUSCULAR; INTRAVENOUS at 09:04

## 2017-04-08 RX ADMIN — CHLORPROMAZINE HYDROCHLORIDE 50 MG: 25 TABLET, SUGAR COATED ORAL at 09:04

## 2017-04-08 RX ADMIN — METOCLOPRAMIDE 10 MG: 5 INJECTION, SOLUTION INTRAMUSCULAR; INTRAVENOUS at 10:04

## 2017-04-08 NOTE — ED AVS SNAPSHOT
[facility]  [address] [address]  [place] [address]               Jose Clark Jr.   2017  8:33 PM   ED    Description:  Male : 1945   Department:  [facility]           Your Care was Coordinated By:     Provider Role From To    Collins Chauhan MD Attending Provider 17 --      Reason for Visit     Hiccups           Diagnoses this Visit        Comments    Intractable hiccups    -  Primary       ED Disposition     ED Disposition Condition Comment    Discharge             To Do List            These Medications        Disp Refills Start End    chlorproMAZINE (THORAZINE) 50 MG tablet 20 tablet 1 2017    Take 1 tablet (50 mg total) by mouth 4 (four) times daily as needed. - Oral    Pharmacy: [facility]/pharmacy #[id number] - [address] Ph #: [phone]       diazePAM (VALIUM) 5 MG tablet 20 tablet 1 2017    Take 1 tablet (5 mg total) by mouth every 6 (six) hours as needed (Hiccups). - Oral    Pharmacy: [facility]/pharmacy #[id number] - [address] Ph #: [phone]         [facility]     [facility] Care [facility] -  Assistance  Unless otherwise directed by your provider, please contact [facility], our nurse care line that is available for  assistance.     Registered nurses in the [facility] provide: appointment scheduling, clinical advisement, health education, and other advisory services.  Call: [phone] (toll free)               Medications           Message regarding Medications     Verify the changes and/or additions to your medication regime listed below are the same as discussed with your clinician today.  If any of these changes or additions are incorrect, please notify your healthcare provider.        START taking these NEW medications        Refills    chlorproMAZINE (THORAZINE) 50 MG tablet 1    Sig: Take 1 tablet (50 mg total) by  mouth 4 (four) times daily as needed.    Class: Print    Route: Oral    diazePAM (VALIUM) 5 MG tablet 1    Sig: Take 1 tablet (5 mg total) by mouth every 6 (six) hours as needed (Hiccups).    Class: Print    Route: Oral      These medications were administered today        Dose Freq    diazePAM injection 10 mg 10 mg ED 1 Time    Sig: Inject 2 mLs (10 mg total) into the muscle ED 1 Time.    Class: Normal    Route: Intramuscular    chlorproMAZINE tablet 50 mg 50 mg ED 1 Time    Sig: Take 2 tablets (50 mg total) by mouth ED 1 Time.    Class: Normal    Route: Oral    metoclopramide HCl injection 10 mg 10 mg ED 1 Time    Sig: Inject 2 mLs (10 mg total) into the muscle ED 1 Time.    Class: Normal    Route: Intramuscular           Verify that the below list of medications is an accurate representation of the medications you are currently taking.  If none reported, the list may be blank. If incorrect, please contact your healthcare provider. Carry this list with you in case of emergency.           Current Medications     albuterol sulfate 90 mcg/actuation AePB Inhale into the lungs. Rescue    albuterol-ipratropium 2.5mg-0.5mg/3mL (DUONEB) 0.5 mg-3 mg(2.5 mg base)/3 mL nebulizer solution Take 3 mLs by nebulization every 6 (six) hours as needed for Wheezing. Rescue    aspirin (ECOTRIN) 81 MG EC tablet Take 81 mg by mouth once daily.    budesonide-formoterol 160-4.5 mcg (SYMBICORT) 160-4.5 mcg/actuation HFAA Inhale 2 puffs into the lungs every 12 (twelve) hours. Controller    chlorproMAZINE (THORAZINE) 50 MG tablet Take 1 tablet (50 mg total) by mouth 4 (four) times daily as needed.    chlorproMAZINE tablet 50 mg Take 2 tablets (50 mg total) by mouth ED 1 Time.    diazePAM (VALIUM) 5 MG tablet Take 1 tablet (5 mg total) by mouth every 6 (six) hours as needed (Hiccups).    diazePAM injection 10 mg Inject 2 mLs (10 mg total) into the muscle ED 1 Time.    epinephrine (EPIPEN) 0.3 mg/0.3 mL (1:1,000) AtIn Inject 0.3 mLs (0.3 mg  total) into the muscle as needed.    ergocalciferol, vitamin D2, 400 unit Tab Take 1 tablet by mouth once daily.    esomeprazole (NEXIUM) 40 MG capsule Take 40 mg by mouth.    famotidine (PEPCID) 20 MG tablet Take 20 mg by mouth once daily.    methyldopa (ALDOMET) 250 MG tablet Take 250 mg by mouth every 12 (twelve) hours.    simvastatin (ZOCOR) 40 MG tablet Take 40 mg by mouth every evening.    tamsulosin (FLOMAX) 0.4 mg Cp24 Take 0.4 mg by mouth once daily.    terazosin (HYTRIN) 1 MG capsule Take 1 mg by mouth every evening.           Clinical Reference Information           Your Vitals Were     BP Pulse Temp Resp Weight SpO2    150/93 84 98.1 °F (36.7 °C) (Oral) 20 91.2 kg (201 lb) 96%    BMI                28.84 kg/m2          Allergies as of 4/8/2017        Reactions    Losartan-hydrochlorothiazide Shortness Of Breath    Angioedema    Ace Inhibitors     Facial swelling    Amlodipine     swelling    Azithromycin     diarrhea    Nebivolol       Immunizations Administered on Date of Encounter - 4/8/2017     None      ED Micro, Lab, POCT     None      ED Imaging Orders     None        Discharge Instructions         Hiccups  The diaphragm is a dome-shaped muscle located at the bottom of the chest. It is the main muscle used for breathing. When you inhale, it pulls down to draw air into the lungs. When you exhale, the muscle pushes up to push air out of the lungs.  A hiccup is a spasm of the diaphragm muscle. This causes you to quickly inhale air, causing the hiccup sound. This may occur after eating or drinking too quickly or too much or having an irritation in the stomach or throat. It may also occur when feeling nervous or excited. Often hiccups seem to happen for no clear reason.  In most cases, hiccups are not serious. They last just a few minutes, and often go away without any treatment. There are many home remedies for treating hiccups and sometimes they work. These include:  · Holding your breath while  counting to 10  · Drinking a glass of water quickly  · Rubbing the back of your tongue with a finger (just short of gagging)  · Making yourself sneeze  · Putting sugar on the back of your tongue  If the hiccups continue, medicine may be needed.  In rare cases hiccups may last for days or weeks, but this is very unusual. When this occurs, it is often the sign of another medical problem. In these cases, tests may be done to help find the cause.  Home care  · If your healthcare provider has prescribed medicine, be sure to take it as directed.  · Try the home remedies mentioned above. If using sugar, place ½ teaspoon of dry sugar and let it dissolve on the back of your tongue. If needed, repeat this process 3 times at 2-minute intervals. People who have diabetes should not use this home remedy.  Follow-up care  Follow up with your healthcare provider, or as directed. If testing was done, youll be told the results and whether there are any new findings that affect your care.  When to seek medical advice  Call your healthcare provider right away if any of these occur:  · Hiccups continue for more than 3 hours  · Hiccups affect sleeping or keep you from eating  · Abdominal pain  Call 911  Call 911 right away if any of these occur:  · Trouble breathing or swallowing  · Unusually fast heart rate  · Fainting  · Vomiting blood  Date Last Reviewed: 6/22/2015  © 5740-5436 [employer]. [address] [place] [address]. All rights reserved. This information is not intended as a substitute for professional medical care. Always follow your healthcare professional's instructions.          [facility] Sign-Up     Activating your [facility] account is as easy as 1-2-3!     1) Visit [email or web address], select Sign Up Now, enter this activation code and your date of birth, then select Next.  [email or web address]  Expires: 5/19/2017  2:41 PM      2) Create a username and password to use when you visit [facility] in the future  and select a security question in case you lose your password and select Next.    3) Enter your e-mail address and click Sign Up!    Additional Information  If you have questions, please e-mail [email or web address] or call [phone] to talk to our [facility] staff. Remember, [facility] is NOT to be used for urgent needs. For medical emergencies, dial 911.         Smoking Cessation     If you would like to quit smoking:   You may be eligible for free services if you are a [place] resident and started smoking cigarettes before September 1, 1988.  Call the Smoking Cessation Trust (SCT) toll free at (771) 132-7763 or (134) 320-1062.   Call 5-298-QUIT-NOW if you do not meet the above criteria.   Contact us via email: [email or web address]   View our website for more information: [email or web address]         [facility] complies with applicable Federal civil rights laws and does not discriminate on the basis of race, color, national origin, age, disability, or sex.        Language Assistance Services     ATTENTION: Language assistance services are available, free of charge. Please call [phone].      ATENCIÓN: Si habla español, tiene a nice disposición servicios gratuitos de asistencia lingüística. Llame al [phone].     CHÚ Ý: N?u b?n nói Ti?ng Vi?t, có các d?ch v? h? tr? ngôn ng? mi?n phí dành cho b?n. G?i s? [phone].

## 2017-04-09 NOTE — ED PROVIDER NOTES
Encounter Date: 4/8/2017       History     Chief Complaint   Patient presents with    Hiccups     pt is 1 week s/p hernia repair and has been having hiccups since surgery - during hiccups he becomes SOB     Review of patient's allergies indicates:   Allergen Reactions    Losartan-hydrochlorothiazide Shortness Of Breath     Angioedema    Ace inhibitors      Facial swelling    Amlodipine      swelling    Azithromycin      diarrhea    Nebivolol      HPI Comments: Has had occasional problems with hiccups in the past including a visit here over a year ago.  Had hiatal hernia surgery by Dr. Lee Amaral at [facility] about a week ago, and has had intractable hiccups since that time.  These are moderately distressing but he does not experience true dyspnea or chest pain.  No problems with the wound healing site.  He has been seen twice in this facility in the last few days and treated with Thorazine, Reglan, and Valium, and has had Thorazine and Reglan at home.  He took doses of these earlier this evening.  He has had some relief and a good night sleep after the previous ER treatments, but hiccups themselves have never actually resolved and his wife reports that he continues to have a cup's even while asleep after the medications at home.  No new complaints.    The history is provided by the patient and the spouse. No  was used.     Past Medical History:   Diagnosis Date    Angio-edema     Dougherty esophagus     COPD (chronic obstructive pulmonary disease)     GERD (gastroesophageal reflux disease)     Hyperlipemia     Hypertension     PLMD (periodic limb movement disorder)     Sensorineural deafness     Stroke     TIA (transient ischemic attack)      Past Surgical History:   Procedure Laterality Date    CYST REMOVAL      right facial cheek     HERNIA REPAIR       History reviewed. No pertinent family history.  Social History   Substance Use Topics    Smoking status: Former  Smoker     Packs/day: 2.00     Years: 35.00     Types: Cigarettes     Quit date: 2/18/1978    Smokeless tobacco: Former User    Alcohol use No     Review of Systems   Constitutional: Negative for chills and fever.   HENT: Negative for congestion, facial swelling, nosebleeds and sinus pressure.    Eyes: Negative for pain and redness.   Respiratory: Negative for chest tightness, shortness of breath and wheezing.    Cardiovascular: Negative for chest pain, palpitations and leg swelling.   Gastrointestinal: Negative for abdominal distention, abdominal pain, diarrhea, nausea and vomiting.   Endocrine: Negative for cold intolerance, polydipsia and polyphagia.   Genitourinary: Negative for difficulty urinating, dysuria, frequency and hematuria.   Musculoskeletal: Negative for arthralgias, back pain, myalgias and neck pain.   Skin: Negative for color change and rash.   Neurological: Negative for dizziness, weakness, numbness and headaches.   Hematological: Negative for adenopathy. Does not bruise/bleed easily.   Psychiatric/Behavioral: Negative for agitation and behavioral problems.   All other systems reviewed and are negative.      Physical Exam   Initial Vitals   BP Pulse Resp Temp SpO2   04/08/17 2032 04/08/17 2032 04/08/17 2032 04/08/17 2032 04/08/17 2032   156/95 97 21 98.1 °F (36.7 °C) 100 %     Physical Exam    Nursing note and vitals reviewed.  Constitutional: He appears well-developed and well-nourished. He is not diaphoretic. No distress.   Uncomfortable from hiccups   HENT:   Head: Normocephalic and atraumatic.   Mouth/Throat: Oropharynx is clear and moist. No oropharyngeal exudate.   Eyes: Conjunctivae and EOM are normal. Pupils are equal, round, and reactive to light. Right eye exhibits no discharge. Left eye exhibits no discharge. No scleral icterus.   Neck: Normal range of motion. Neck supple. No thyromegaly present. No tracheal deviation present. No JVD present.   Cardiovascular: Normal rate, regular  rhythm and normal heart sounds. Exam reveals no gallop and no friction rub.    No murmur heard.  Pulmonary/Chest: Breath sounds normal. No respiratory distress. He has no wheezes. He has no rhonchi. He has no rales. He exhibits no tenderness.   Abdominal: Soft. Bowel sounds are normal. He exhibits no distension and no mass. There is no tenderness. There is no rebound and no guarding.   Laparoscopic surgical wounds healing well   Musculoskeletal: Normal range of motion. He exhibits no edema or tenderness.   Lymphadenopathy:     He has no cervical adenopathy.   Neurological: He is alert and oriented to person, place, and time. He has normal strength. No cranial nerve deficit.   Skin: Skin is warm and dry. No rash noted. No erythema.   Psychiatric: He has a normal mood and affect. His behavior is normal. Judgment and thought content normal.         ED Course   Procedures  Labs Reviewed - No data to display     9:49 PM Improved but hiccups persist.    11:04 PM Improved but not completely resolved.                          ED Course     Clinical Impression:     1. Intractable hiccups          Disposition:   Disposition: Discharged  Condition: Stable       Collins Chauhan MD  04/08/17 0611

## 2017-04-09 NOTE — ED NOTES
Pt noted with persistent hiccups.  He is alert and oriented - family member noted at bedside.      Cardiac: s1s2 auscultated; no murmur noted  Pulmonary: BBS-clear  Abd: soft; non-tender + normoactive bowel sounds noted; recent/healing laparoscopic incisions noted to abdomen - 2 steri strips noted just proximal to umbilicus; some generalized ecchymosis noted to abdomen.    Musculoskeletal: steady gait noted with ambulation; no swelling/deformity noted.

## 2017-04-09 NOTE — DISCHARGE INSTRUCTIONS
Hiccups  The diaphragm is a dome-shaped muscle located at the bottom of the chest. It is the main muscle used for breathing. When you inhale, it pulls down to draw air into the lungs. When you exhale, the muscle pushes up to push air out of the lungs.  A hiccup is a spasm of the diaphragm muscle. This causes you to quickly inhale air, causing the hiccup sound. This may occur after eating or drinking too quickly or too much or having an irritation in the stomach or throat. It may also occur when feeling nervous or excited. Often hiccups seem to happen for no clear reason.  In most cases, hiccups are not serious. They last just a few minutes, and often go away without any treatment. There are many home remedies for treating hiccups and sometimes they work. These include:  · Holding your breath while counting to 10  · Drinking a glass of water quickly  · Rubbing the back of your tongue with a finger (just short of gagging)  · Making yourself sneeze  · Putting sugar on the back of your tongue  If the hiccups continue, medicine may be needed.  In rare cases hiccups may last for days or weeks, but this is very unusual. When this occurs, it is often the sign of another medical problem. In these cases, tests may be done to help find the cause.  Home care  · If your healthcare provider has prescribed medicine, be sure to take it as directed.  · Try the home remedies mentioned above. If using sugar, place ½ teaspoon of dry sugar and let it dissolve on the back of your tongue. If needed, repeat this process 3 times at 2-minute intervals. People who have diabetes should not use this home remedy.  Follow-up care  Follow up with your healthcare provider, or as directed. If testing was done, youll be told the results and whether there are any new findings that affect your care.  When to seek medical advice  Call your healthcare provider right away if any of these occur:  · Hiccups continue for more than 3 hours  · Hiccups  affect sleeping or keep you from eating  · Abdominal pain  Call 911  Call 911 right away if any of these occur:  · Trouble breathing or swallowing  · Unusually fast heart rate  · Fainting  · Vomiting blood  Date Last Reviewed: 6/22/2015  © 3371-7885 [employer]. [address] [place] [address]. All rights reserved. This information is not intended as a substitute for professional medical care. Always follow your healthcare professional's instructions.

## 2018-04-05 ENCOUNTER — TELEPHONE (OUTPATIENT)
Dept: RADIOLOGY | Facility: HOSPITAL | Age: 73
End: 2018-04-05

## 2018-04-06 ENCOUNTER — HOSPITAL ENCOUNTER (OUTPATIENT)
Dept: RADIOLOGY | Facility: HOSPITAL | Age: 73
Discharge: HOME | End: 2018-04-06
Attending: INTERNAL MEDICINE
Payer: MEDICARE

## 2018-04-06 DIAGNOSIS — R06.00 DYSPNEA: ICD-10-CM

## 2018-04-06 PROCEDURE — 71275 CT ANGIOGRAPHY CHEST: CPT | Mod: TC,PO

## 2018-04-06 PROCEDURE — 25500020 PHARM REV CODE 255: Mod: PO

## 2018-04-06 PROCEDURE — 71275 CT ANGIOGRAPHY CHEST: CPT | Mod: 26,,, | Performed by: RADIOLOGY

## 2018-04-06 RX ADMIN — IOHEXOL 100 ML: 350 INJECTION, SOLUTION INTRAVENOUS at 08:04

## 2018-06-02 ENCOUNTER — HOSPITAL ENCOUNTER (EMERGENCY)
Facility: HOSPITAL | Age: 73
Discharge: HOME | End: 2018-06-02
Attending: EMERGENCY MEDICINE
Payer: MEDICARE

## 2018-06-02 VITALS
RESPIRATION RATE: 18 BRPM | BODY MASS INDEX: 27.35 KG/M2 | SYSTOLIC BLOOD PRESSURE: 124 MMHG | TEMPERATURE: 98 F | DIASTOLIC BLOOD PRESSURE: 75 MMHG | WEIGHT: 190.63 LBS | HEART RATE: 82 BPM | OXYGEN SATURATION: 99 %

## 2018-06-02 DIAGNOSIS — R33.8 ACUTE URINARY RETENTION: Primary | ICD-10-CM

## 2018-06-02 LAB
BILIRUB UR QL STRIP: NEGATIVE
CLARITY UR REFRACT.AUTO: CLEAR
COLOR UR AUTO: YELLOW
GLUCOSE UR QL STRIP: NEGATIVE
HGB UR QL STRIP: NEGATIVE
KETONES UR QL STRIP: NEGATIVE
LEUKOCYTE ESTERASE UR QL STRIP: NEGATIVE
NITRITE UR QL STRIP: NEGATIVE
PH UR STRIP: 6 [PH] (ref 5–8)
PROT UR QL STRIP: NEGATIVE
SP GR UR STRIP: 1.02 (ref 1–1.03)
URN SPEC COLLECT METH UR: NORMAL
UROBILINOGEN UR STRIP-ACNC: NEGATIVE EU/DL

## 2018-06-02 PROCEDURE — 87086 URINE CULTURE/COLONY COUNT: CPT

## 2018-06-02 PROCEDURE — 51702 INSERT TEMP BLADDER CATH: CPT

## 2018-06-02 PROCEDURE — 25000003 PHARM REV CODE 250: Performed by: EMERGENCY MEDICINE

## 2018-06-02 PROCEDURE — 99284 EMERGENCY DEPT VISIT MOD MDM: CPT | Mod: 25

## 2018-06-02 PROCEDURE — 81003 URINALYSIS AUTO W/O SCOPE: CPT

## 2018-06-02 RX ORDER — TAMSULOSIN HYDROCHLORIDE 0.4 MG/1
0.4 CAPSULE ORAL
Status: COMPLETED | OUTPATIENT
Start: 2018-06-02 | End: 2018-06-02

## 2018-06-02 RX ORDER — TAMSULOSIN HYDROCHLORIDE 0.4 MG/1
0.4 CAPSULE ORAL DAILY
Qty: 30 CAPSULE | Refills: 1 | Status: SHIPPED | OUTPATIENT
Start: 2018-06-02 | End: 2019-06-02

## 2018-06-02 RX ADMIN — TAMSULOSIN HYDROCHLORIDE 0.4 MG: 0.4 CAPSULE ORAL at 07:06

## 2018-06-02 NOTE — DISCHARGE INSTRUCTIONS
_________________    Call [facility] or the urologist of your choice for an appointment early next week.    Return as needed.    _________________

## 2018-06-02 NOTE — ED PROVIDER NOTES
Encounter Date: 6/2/2018       History     Chief Complaint   Patient presents with    Urine retention     unable to void throughout night     Previously on Flomax, no longer taking.  No recent history of urinary problems or urologist evaluation.  He believes he had 1 episode of urinary retention in the past but apparently did not require home with Talbot catheter other than postoperatively.  Decreased urinary volume in the last few days, no significant voiding since yesterday.  No nausea, vomiting, fever, chills, hematuria, or other complaints.  Moderate discomfort from urinary retention.  Here with his wife.  On arrival, bladder scan shows greater than 650 mL of retained urine.  No other complaints.      The history is provided by the patient. No  was used.     Review of patient's allergies indicates:   Allergen Reactions    Losartan-hydrochlorothiazide Shortness Of Breath     Angioedema    Ace inhibitors      Facial swelling    Amlodipine      swelling    Azithromycin      diarrhea    Nebivolol      Past Medical History:   Diagnosis Date    Angio-edema     Dougehrty esophagus     COPD (chronic obstructive pulmonary disease)     GERD (gastroesophageal reflux disease)     Hyperlipemia     Hypertension     PLMD (periodic limb movement disorder)     Sensorineural deafness     Stroke     TIA (transient ischemic attack)      Past Surgical History:   Procedure Laterality Date    CYST REMOVAL      right facial cheek     HERNIA REPAIR       History reviewed. No pertinent family history.  Social History   Substance Use Topics    Smoking status: Former Smoker     Packs/day: 2.00     Years: 35.00     Types: Cigarettes     Quit date: 2/18/1978    Smokeless tobacco: Former User    Alcohol use No     Review of Systems   Constitutional: Negative for chills and fever.   HENT: Negative for congestion, facial swelling, nosebleeds and sinus pressure.    Eyes: Negative for pain and redness.    Respiratory: Negative for chest tightness, shortness of breath and wheezing.    Cardiovascular: Negative for chest pain, palpitations and leg swelling.   Gastrointestinal: Negative for abdominal distention, abdominal pain, diarrhea, nausea and vomiting.   Endocrine: Negative for cold intolerance, polydipsia and polyphagia.   Genitourinary: Positive for decreased urine volume and difficulty urinating. Negative for dysuria, frequency and hematuria.   Musculoskeletal: Negative for arthralgias, back pain, myalgias and neck pain.   Skin: Negative for color change and rash.   Neurological: Negative for dizziness, weakness, numbness and headaches.   Hematological: Negative for adenopathy. Does not bruise/bleed easily.   Psychiatric/Behavioral: Negative for agitation and behavioral problems.   All other systems reviewed and are negative.      Physical Exam     Initial Vitals [06/02/18 0651]   BP Pulse Resp Temp SpO2   (!) 157/111 106 18 97.6 °F (36.4 °C) 96 %      MAP       126.33         Physical Exam    Nursing note and vitals reviewed.  Constitutional: He appears well-developed and well-nourished. He is not diaphoretic. He appears distressed.   HENT:   Head: Normocephalic and atraumatic.   Mouth/Throat: Oropharynx is clear and moist. No oropharyngeal exudate.   Eyes: Conjunctivae and EOM are normal. Pupils are equal, round, and reactive to light. Right eye exhibits no discharge. Left eye exhibits no discharge. No scleral icterus.   Neck: Normal range of motion. Neck supple. No thyromegaly present. No tracheal deviation present. No JVD present.   Cardiovascular: Normal rate, regular rhythm and normal heart sounds. Exam reveals no gallop and no friction rub.    No murmur heard.  Pulmonary/Chest: Breath sounds normal. No respiratory distress. He has no wheezes. He has no rhonchi. He has no rales. He exhibits no tenderness.   Abdominal: Soft. Bowel sounds are normal. He exhibits distension. He exhibits no mass. There is no  tenderness. There is no rebound and no guarding.   Distended, mildly tender bladder   Musculoskeletal: Normal range of motion. He exhibits no edema or tenderness.   Lymphadenopathy:     He has no cervical adenopathy.   Neurological: He is alert and oriented to person, place, and time. He has normal strength. No cranial nerve deficit.   Skin: Skin is warm and dry. No rash noted. No erythema.   Psychiatric: He has a normal mood and affect. His behavior is normal. Judgment and thought content normal.         ED Course   Procedures  Labs Reviewed   CULTURE, URINE   URINALYSIS        7:29 AM Complete relief; 675 ml clear yellow urine per Fritz.                          Clinical Impression:     1. Acute urinary retention          Disposition:   Disposition: Discharged  Condition: Stable                        Collins Chauhan MD  06/02/18 9387

## 2018-06-03 LAB — BACTERIA UR CULT: NO GROWTH

## 2018-06-17 ENCOUNTER — HOSPITAL ENCOUNTER (OUTPATIENT)
Facility: HOSPITAL | Age: 73
Discharge: HOME | End: 2018-06-18
Attending: EMERGENCY MEDICINE | Admitting: INTERNAL MEDICINE
Payer: MEDICARE

## 2018-06-17 DIAGNOSIS — I63.9 STROKE: ICD-10-CM

## 2018-06-17 DIAGNOSIS — G45.9 TRANSIENT CEREBRAL ISCHEMIA, UNSPECIFIED TYPE: Primary | ICD-10-CM

## 2018-06-17 DIAGNOSIS — G45.9 TIA (TRANSIENT ISCHEMIC ATTACK): ICD-10-CM

## 2018-06-17 PROBLEM — R33.8 BENIGN PROSTATIC HYPERPLASIA WITH URINARY RETENTION: Status: ACTIVE | Noted: 2018-06-04

## 2018-06-17 PROBLEM — N40.1 BENIGN PROSTATIC HYPERPLASIA WITH URINARY RETENTION: Status: ACTIVE | Noted: 2018-06-04

## 2018-06-17 PROBLEM — R33.9 URINARY RETENTION: Status: ACTIVE | Noted: 2017-03-31

## 2018-06-17 PROBLEM — Z13.1 DIABETES MELLITUS SCREENING: Status: ACTIVE | Noted: 2018-04-02

## 2018-06-17 PROBLEM — G47.61 PERIODIC LIMB MOVEMENT DISORDER (PLMD): Status: ACTIVE | Noted: 2017-01-04

## 2018-06-17 PROBLEM — J32.2 ETHMOID SINUSITIS: Status: ACTIVE | Noted: 2018-06-17

## 2018-06-17 PROBLEM — I74.9 TIA DUE TO EMBOLISM: Status: ACTIVE | Noted: 2018-06-17

## 2018-06-17 PROBLEM — Z97.8 FOLEY CATHETER IN PLACE: Status: ACTIVE | Noted: 2018-06-04

## 2018-06-17 LAB
ALBUMIN SERPL BCP-MCNC: 3.6 G/DL
ALP SERPL-CCNC: 60 U/L
ALT SERPL W/O P-5'-P-CCNC: 11 U/L
ANION GAP SERPL CALC-SCNC: 8 MMOL/L
APTT BLDCRRT: 26.6 SEC
AST SERPL-CCNC: 14 U/L
BASOPHILS # BLD AUTO: 0.03 K/UL
BASOPHILS NFR BLD: 0.5 %
BILIRUB SERPL-MCNC: 0.6 MG/DL
BUN SERPL-MCNC: 17 MG/DL
CALCIUM SERPL-MCNC: 8.8 MG/DL
CHLORIDE SERPL-SCNC: 106 MMOL/L
CO2 SERPL-SCNC: 24 MMOL/L
CREAT SERPL-MCNC: 1 MG/DL
DIFFERENTIAL METHOD: ABNORMAL
EOSINOPHIL # BLD AUTO: 0.3 K/UL
EOSINOPHIL NFR BLD: 5.7 %
ERYTHROCYTE [DISTWIDTH] IN BLOOD BY AUTOMATED COUNT: 14.3 %
EST. GFR  (AFRICAN AMERICAN): >60 ML/MIN/1.73 M^2
EST. GFR  (NON AFRICAN AMERICAN): >60 ML/MIN/1.73 M^2
GLUCOSE SERPL-MCNC: 101 MG/DL
HCT VFR BLD AUTO: 38.5 %
HGB BLD-MCNC: 12.6 G/DL
INR PPP: 1.1
LYMPHOCYTES # BLD AUTO: 1 K/UL
LYMPHOCYTES NFR BLD: 17.9 %
MCH RBC QN AUTO: 28.7 PG
MCHC RBC AUTO-ENTMCNC: 32.7 G/DL
MCV RBC AUTO: 88 FL
MONOCYTES # BLD AUTO: 0.6 K/UL
MONOCYTES NFR BLD: 10.2 %
NEUTROPHILS # BLD AUTO: 3.7 K/UL
NEUTROPHILS NFR BLD: 65.5 %
PLATELET # BLD AUTO: 146 K/UL
PMV BLD AUTO: 11.7 FL
POCT GLUCOSE: 94 MG/DL (ref 70–110)
POTASSIUM SERPL-SCNC: 4.3 MMOL/L
PROT SERPL-MCNC: 6.6 G/DL
PROTHROMBIN TIME: 11.2 SEC
RBC # BLD AUTO: 4.39 M/UL
SODIUM SERPL-SCNC: 138 MMOL/L
TSH SERPL DL<=0.005 MIU/L-ACNC: 0.88 UIU/ML
WBC # BLD AUTO: 5.6 K/UL

## 2018-06-17 PROCEDURE — 25000242 PHARM REV CODE 250 ALT 637 W/ HCPCS: Performed by: INTERNAL MEDICINE

## 2018-06-17 PROCEDURE — 94640 AIRWAY INHALATION TREATMENT: CPT

## 2018-06-17 PROCEDURE — 84443 ASSAY THYROID STIM HORMONE: CPT

## 2018-06-17 PROCEDURE — 80053 COMPREHEN METABOLIC PANEL: CPT

## 2018-06-17 PROCEDURE — 93010 ELECTROCARDIOGRAM REPORT: CPT | Mod: ,,, | Performed by: NUCLEAR MEDICINE

## 2018-06-17 PROCEDURE — 85730 THROMBOPLASTIN TIME PARTIAL: CPT

## 2018-06-17 PROCEDURE — 85025 COMPLETE CBC W/AUTO DIFF WBC: CPT

## 2018-06-17 PROCEDURE — 99900035 HC TECH TIME PER 15 MIN (STAT)

## 2018-06-17 PROCEDURE — 93005 ELECTROCARDIOGRAM TRACING: CPT

## 2018-06-17 PROCEDURE — 82962 GLUCOSE BLOOD TEST: CPT

## 2018-06-17 PROCEDURE — 25000003 PHARM REV CODE 250: Performed by: NURSE PRACTITIONER

## 2018-06-17 PROCEDURE — 36000 PLACE NEEDLE IN VEIN: CPT | Mod: 59

## 2018-06-17 PROCEDURE — A4216 STERILE WATER/SALINE, 10 ML: HCPCS | Performed by: NURSE PRACTITIONER

## 2018-06-17 PROCEDURE — G0425 INPT/ED TELECONSULT30: HCPCS | Mod: GT,,, | Performed by: PSYCHIATRY & NEUROLOGY

## 2018-06-17 PROCEDURE — 96372 THER/PROPH/DIAG INJ SC/IM: CPT

## 2018-06-17 PROCEDURE — 63600175 PHARM REV CODE 636 W HCPCS: Performed by: NURSE PRACTITIONER

## 2018-06-17 PROCEDURE — 80061 LIPID PANEL: CPT

## 2018-06-17 PROCEDURE — 99285 EMERGENCY DEPT VISIT HI MDM: CPT | Mod: 25

## 2018-06-17 PROCEDURE — 85610 PROTHROMBIN TIME: CPT

## 2018-06-17 PROCEDURE — 96361 HYDRATE IV INFUSION ADD-ON: CPT

## 2018-06-17 PROCEDURE — 96374 THER/PROPH/DIAG INJ IV PUSH: CPT

## 2018-06-17 PROCEDURE — G0378 HOSPITAL OBSERVATION PER HR: HCPCS

## 2018-06-17 PROCEDURE — 25000003 PHARM REV CODE 250: Performed by: EMERGENCY MEDICINE

## 2018-06-17 PROCEDURE — 25000242 PHARM REV CODE 250 ALT 637 W/ HCPCS: Performed by: NURSE PRACTITIONER

## 2018-06-17 RX ORDER — PANTOPRAZOLE SODIUM 40 MG/1
40 TABLET, DELAYED RELEASE ORAL DAILY
Status: DISCONTINUED | OUTPATIENT
Start: 2018-06-18 | End: 2018-06-18 | Stop reason: HOSPADM

## 2018-06-17 RX ORDER — AMOXICILLIN AND CLAVULANATE POTASSIUM 875; 125 MG/1; MG/1
1 TABLET, FILM COATED ORAL EVERY 12 HOURS
Status: DISCONTINUED | OUTPATIENT
Start: 2018-06-17 | End: 2018-06-18 | Stop reason: HOSPADM

## 2018-06-17 RX ORDER — SIMVASTATIN 20 MG/1
40 TABLET, FILM COATED ORAL NIGHTLY
Status: DISCONTINUED | OUTPATIENT
Start: 2018-06-17 | End: 2018-06-18 | Stop reason: HOSPADM

## 2018-06-17 RX ORDER — TAMSULOSIN HYDROCHLORIDE 0.4 MG/1
0.4 CAPSULE ORAL DAILY
Status: DISCONTINUED | OUTPATIENT
Start: 2018-06-18 | End: 2018-06-18 | Stop reason: HOSPADM

## 2018-06-17 RX ORDER — METHYLDOPA 250 MG/1
250 TABLET, FILM COATED ORAL
COMMUNITY
Start: 2017-08-07

## 2018-06-17 RX ORDER — BUDESONIDE 0.5 MG/2ML
0.5 INHALANT ORAL EVERY 12 HOURS
Status: DISCONTINUED | OUTPATIENT
Start: 2018-06-17 | End: 2018-06-18 | Stop reason: HOSPADM

## 2018-06-17 RX ORDER — ENOXAPARIN SODIUM 100 MG/ML
40 INJECTION SUBCUTANEOUS EVERY 24 HOURS
Status: DISCONTINUED | OUTPATIENT
Start: 2018-06-17 | End: 2018-06-18 | Stop reason: HOSPADM

## 2018-06-17 RX ORDER — BUDESONIDE AND FORMOTEROL FUMARATE DIHYDRATE 160; 4.5 UG/1; UG/1
2 AEROSOL RESPIRATORY (INHALATION) EVERY 12 HOURS
Status: DISCONTINUED | OUTPATIENT
Start: 2018-06-17 | End: 2018-06-17 | Stop reason: CLARIF

## 2018-06-17 RX ORDER — ASPIRIN 81 MG/1
81 TABLET ORAL DAILY
Status: DISCONTINUED | OUTPATIENT
Start: 2018-06-18 | End: 2018-06-18 | Stop reason: HOSPADM

## 2018-06-17 RX ORDER — SODIUM CHLORIDE 0.9 % (FLUSH) 0.9 %
3 SYRINGE (ML) INJECTION EVERY 8 HOURS
Status: DISCONTINUED | OUTPATIENT
Start: 2018-06-17 | End: 2018-06-18 | Stop reason: HOSPADM

## 2018-06-17 RX ORDER — ARFORMOTEROL TARTRATE 15 UG/2ML
15 SOLUTION RESPIRATORY (INHALATION) 2 TIMES DAILY
Status: DISCONTINUED | OUTPATIENT
Start: 2018-06-17 | End: 2018-06-18 | Stop reason: HOSPADM

## 2018-06-17 RX ORDER — CETIRIZINE HYDROCHLORIDE 5 MG/1
5 TABLET ORAL NIGHTLY
Status: DISCONTINUED | OUTPATIENT
Start: 2018-06-17 | End: 2018-06-17

## 2018-06-17 RX ORDER — SODIUM CHLORIDE 9 MG/ML
INJECTION, SOLUTION INTRAVENOUS CONTINUOUS
Status: ACTIVE | OUTPATIENT
Start: 2018-06-17 | End: 2018-06-18

## 2018-06-17 RX ORDER — ASPIRIN 325 MG
325 TABLET, DELAYED RELEASE (ENTERIC COATED) ORAL
Status: COMPLETED | OUTPATIENT
Start: 2018-06-17 | End: 2018-06-17

## 2018-06-17 RX ORDER — FLUTICASONE PROPIONATE 50 MCG
2 SPRAY, SUSPENSION (ML) NASAL DAILY
Status: DISCONTINUED | OUTPATIENT
Start: 2018-06-17 | End: 2018-06-18 | Stop reason: HOSPADM

## 2018-06-17 RX ADMIN — ENOXAPARIN SODIUM 40 MG: 100 INJECTION SUBCUTANEOUS at 10:06

## 2018-06-17 RX ADMIN — SODIUM CHLORIDE: 0.9 INJECTION, SOLUTION INTRAVENOUS at 09:06

## 2018-06-17 RX ADMIN — SODIUM CHLORIDE 3 ML: 9 INJECTION, SOLUTION INTRAMUSCULAR; INTRAVENOUS; SUBCUTANEOUS at 10:06

## 2018-06-17 RX ADMIN — BUDESONIDE 0.5 MG: 0.5 SUSPENSION RESPIRATORY (INHALATION) at 08:06

## 2018-06-17 RX ADMIN — ARFORMOTEROL TARTRATE 15 MCG: 15 SOLUTION RESPIRATORY (INHALATION) at 08:06

## 2018-06-17 RX ADMIN — AMOXICILLIN AND CLAVULANATE POTASSIUM 1 TABLET: 875; 125 TABLET, FILM COATED ORAL at 10:06

## 2018-06-17 RX ADMIN — FLUTICASONE PROPIONATE 100 MCG: 50 SPRAY, METERED NASAL at 11:06

## 2018-06-17 RX ADMIN — SIMVASTATIN 40 MG: 20 TABLET, FILM COATED ORAL at 10:06

## 2018-06-17 RX ADMIN — ASPIRIN 325 MG: 325 TABLET, DELAYED RELEASE ORAL at 03:06

## 2018-06-17 NOTE — PROGRESS NOTES
Patient arrived to unit from [facility] via ambulance.   Patient in room 225.  Transferred into bed with standby assist.   Phone report given by QING Espitia at 1650.  Charge nurse advised of patient arrival.   VS currently stable. No complaints of pain   Tele monitored applied.Plan of care reviewed, patient verbalized understanding.  Patient oriented to room, rounding sheet and call bell.   Bed in lowest position, call light in reach.  Encouraged to notify of all needs.   Will continue to monitor.

## 2018-06-17 NOTE — ED NOTES
Report given to Iglesia for room 225a. [facility] called by Sue ROLDAN. Patient in NAD awaiting transfer at present.

## 2018-06-17 NOTE — SUBJECTIVE & OBJECTIVE
"  Woke up with symptoms?: no  Last known normal:    1300    Recent bleeding noted: no  Does the patient take any Blood Thinners? yes  Medications: Antiplatelets:  aspirin      Past Medical History: hypertension, hyperlipidemia and stroke    Past Surgical History: no major surgeries within the last 2 weeks    Family History: no relevant history    Social History: former smoker    Allergies: Losartan-Hydrochlorothiazide  Ace Inhibitors  Amlodipine  Azithromycin  Nebivolol      Review of Systems   Musculoskeletal: Positive for gait problem.   Neurological: Positive for facial asymmetry, speech difficulty and weakness.   All other systems reviewed and are negative.    Objective:   Vitals: Blood pressure 131/77, pulse 86, temperature 98.4 °F (36.9 °C), temperature source Oral, resp. rate 20, height 5' 10" (1.778 m), weight 86.8 kg (191 lb 6.4 oz), SpO2 97 %.  CT READ: Yes  No hemmorhage. No mass effect. No early infarct signs.     Physical Exam   Constitutional: He appears well-developed.   HENT:   Head: Normocephalic.   Eyes: Pupils are equal, round, and reactive to light.   Neck: Normal range of motion.   Pulmonary/Chest: Effort normal.   Neurological: He is alert.   Psychiatric: He has a normal mood and affect.         "

## 2018-06-17 NOTE — HPI
72 year old presents with transient L HP. Now resolved    PMHX: stroke, HTN HLD    Today at 1300 developed L HP  Now resolved  CT head no acute changes

## 2018-06-17 NOTE — ED PROVIDER NOTES
"Encounter Date: 6/17/2018       History     Chief Complaint   Patient presents with    Altered Mental Status     per wife pt was home alone and had episode of stumbling and not being able to move left arm. she states he is "moving slow"     Patient presented with left-sided weakness prior to arrival.  He reports that he is watching TV around 1:00 and couldn't get up from his chair and he couldn't lift left arm and left leg.  Symptoms have slowly resolved he is now able to lift both arm and leg although they feel mildly heavy.  No other aggravating or relieving factors.          Review of patient's allergies indicates:   Allergen Reactions    Losartan-hydrochlorothiazide Shortness Of Breath     Angioedema    Ace inhibitors      Facial swelling    Amlodipine      swelling    Azithromycin      diarrhea    Nebivolol      Past Medical History:   Diagnosis Date    Angio-edema     Dougherty esophagus     COPD (chronic obstructive pulmonary disease)     GERD (gastroesophageal reflux disease)     Hyperlipemia     Hypertension     PLMD (periodic limb movement disorder)     Sensorineural deafness     Stroke     TIA (transient ischemic attack)      Past Surgical History:   Procedure Laterality Date    CYST REMOVAL      right facial cheek     HERNIA REPAIR       History reviewed. No pertinent family history.  Social History   Substance Use Topics    Smoking status: Former Smoker     Packs/day: 2.00     Years: 35.00     Types: Cigarettes     Quit date: 2/18/1978    Smokeless tobacco: Former User    Alcohol use No     Review of Systems   Constitutional: Negative for fever.   HENT: Negative for sore throat.    Respiratory: Negative for shortness of breath.    Cardiovascular: Negative for chest pain.   Gastrointestinal: Negative for nausea.   Genitourinary: Negative for dysuria.   Musculoskeletal: Negative for back pain.   Skin: Negative for rash.   Neurological: Negative for weakness.   Hematological: Does not " bruise/bleed easily.       Physical Exam     Initial Vitals [06/17/18 1411]   BP Pulse Resp Temp SpO2   131/77 86 20 98.4 °F (36.9 °C) 97 %      MAP       --         Physical Exam    Nursing note and vitals reviewed.  Constitutional: He appears well-developed and well-nourished.   HENT:   Head: Normocephalic and atraumatic.   Nose: Nose normal.   Mouth/Throat: Oropharynx is clear and moist.   Eyes: Conjunctivae and EOM are normal.   Neck: Normal range of motion. Neck supple.   Cardiovascular: Normal rate, regular rhythm, normal heart sounds and intact distal pulses.   Pulmonary/Chest: Breath sounds normal.   Abdominal: Soft. Bowel sounds are normal.   Musculoskeletal: Normal range of motion.   Neurological: He is alert and oriented to person, place, and time. He has normal strength. No cranial nerve deficit.   No pronator drift, equal strength in bilateral arms and  strength.  Able to walk with no difficulty   Skin: Skin is warm and dry.   Psychiatric: He has a normal mood and affect. Thought content normal.         ED Course   Procedures  Labs Reviewed   CBC W/ AUTO DIFFERENTIAL - Abnormal; Notable for the following:        Result Value    RBC 4.39 (*)     Hemoglobin 12.6 (*)     Hematocrit 38.5 (*)     Platelets 146 (*)     Lymph% 17.9 (*)     All other components within normal limits   COMPREHENSIVE METABOLIC PANEL   PROTIME-INR   TSH   APTT   LIPID PANEL   POCT GLUCOSE   POCT GLUCOSE          X-Ray Chest AP Portable   Final Result      Stable cardiomegaly.  Tiny bilateral pleural effusions         Electronically signed by: Samuel Martínez MD   Date:    06/17/2018   Time:    15:10      CT Head Without Contrast   Final Result      Negative for acute intracranial abnormality.      All CT scans at this facility use dose modulation, iterative reconstruction and/or weight based dosing when appropriate to reduce radiation dose to as low as reasonably achievable.         Electronically signed by: Samuel Martínez MD    Date:    06/17/2018   Time:    14:37                  18:00  vascular neurology agreed that patient had a TIA and needs to be admitted for further workup.  Dr. Sevilla was consult and agreed to admit the patient for TIA workup.              Clinical Impression:   The primary encounter diagnosis was Transient cerebral ischemia, unspecified type. A diagnosis of Stroke was also pertinent to this visit.      Disposition:   Disposition: Admitted  Condition: Stable                        Jerry Swann Do, MD  06/17/18 1800

## 2018-06-17 NOTE — ED NOTES
Patient to be admitted. Family at bedside aware. Patient reported after walking for MD on Tele stroke that he feels like he is at his base line. MD spoke with DR. Maciel after evaluating patient. Patient current in NAD. Will continue to monitor.

## 2018-06-17 NOTE — ASSESSMENT & PLAN NOTE
R hemisphere TIA    Would focus on cardiac causes given risk factors and stroke history   Antithrombotics for secondary stroke prevention: Antiplatelets: Aspirin: 81 mg daily  Clopidogrel: 75 mg daily    Statins for secondary stroke prevention and hyperlipidemia, if present:   Statins: Atorvastatin- 80 mg daily    Aggressive risk factor modification: HTN, HLD     Rehab efforts: Occupational Therapy, PT/OT/SLP to evaluate and treat, PM&R consult     Diagnostics ordered/pending: Carotid ultrasound to assess vasculature, HgbA1C to assess blood glucose levels, Lipid Profile to assess cholesterol levels, MRA head to assess vasculature, MRA neck/arch to assess vasculature, MRI head without contrast to assess brain parenchyma, TTE to assess cardiac function/status , Other: loop recorder    VTE prophylaxis: Enoxaparin 40 mg SQ every 24 hours    BP parameters: TIA: SBP <220 until imaging confirmation of no infarct

## 2018-06-17 NOTE — ED NOTES
[facility] w/ [facility] w/ [facility] dispatch who states unit is currently driving into [place] right now and should be arriving here to transport patient within the next 10-20 minutes.

## 2018-06-17 NOTE — CONSULTS
[facility]  Vascular Neurology  Comprehensive Stroke Center  Tele-Consultation Note      Inpatient consult to Telemedicine-Stroke  Consult performed by: HAIM DISLA  Consult ordered by: HERMELINDA ENAMORADO          Consulting Provider: [facility] Physician:: marychuy Enamorado  Current Providers  No providers found    Patient Location: [facility]  Spoke hospital nurse at bedside with patient assisting consultant.     Patient information was obtained from patient and spouse/SO.       Assessment/Plan:     STROKE DOCUMENTATION     Acute Stroke Times:   Acute Stroke Times   Stroke Team Arrival Date: 06/17/18  Stroke Team Arrival Time: 1435  CT Interpretation Time: 1437    NIH Scale:  1a. Level Of Consciousness: 0-->Alert: keenly responsive  1b. LOC Questions: 0-->Answers both questions correctly  1c. LOC Commands: 0-->Performs both tasks correctly  2. Best Gaze: 0-->Normal  3. Visual: 0-->No visual loss  4. Facial Palsy: 0-->Normal symmetrical movements  5a. Motor Arm, Left: 0-->No drift: limb holds 90 (or 45) degrees for full 10 secs  5b. Motor Arm, Right: 0-->No drift: limb holds 90 (or 45) degrees for full 10 secs  6a. Motor Leg, Left: 0-->No drift: leg holds 30 degree position for full 5 secs  6b. Motor Leg, Right: 0-->No drift: leg holds 30 degree position for full 5 secs  7. Limb Ataxia: 0-->Absent  8. Sensory: 0-->Normal: no sensory loss  9. Best Language: 0-->No aphasia: normal  10. Dysarthria: 0-->Normal  11. Extinction and Inattention (formerly Neglect): 0-->No abnormality  Total (NIH Stroke Scale): 0     Modified Pacific    Stedman Coma Scale:    ABCD2 Score:    [id number] Score:   HAS -BLED Score:   ICH Score:   Hunt & Morley Classification:       Diagnoses:   TIA due to embolism    R hemisphere TIA    Would focus on cardiac causes given risk factors and stroke history   Antithrombotics for secondary stroke prevention: Antiplatelets: Aspirin: 81 mg  "daily  Clopidogrel: 75 mg daily    Statins for secondary stroke prevention and hyperlipidemia, if present:   Statins: Atorvastatin- 80 mg daily    Aggressive risk factor modification: HTN, HLD     Rehab efforts: Occupational Therapy, PT/OT/SLP to evaluate and treat, PM&R consult     Diagnostics ordered/pending: Carotid ultrasound to assess vasculature, HgbA1C to assess blood glucose levels, Lipid Profile to assess cholesterol levels, MRA head to assess vasculature, MRA neck/arch to assess vasculature, MRI head without contrast to assess brain parenchyma, TTE to assess cardiac function/status , Other: loop recorder    VTE prophylaxis: Enoxaparin 40 mg SQ every 24 hours    BP parameters: TIA: SBP <220 until imaging confirmation of no infarct                 Blood pressure 131/77, pulse 86, temperature 98.4 °F (36.9 °C), temperature source Oral, resp. rate 20, height 5' 10" (1.778 m), weight 86.8 kg (191 lb 6.4 oz), SpO2 97 %.  Alteplase Eligible?: No  Alteplase Recommendation: Alteplase not recommended due to Symptoms resolved   Possible Interventional Revascularization Candidate? No; No significant neurological deficit    Disposition Recommendation: transfer to system [facility] by  ground  standard      Subjective:     History of Present Illness:  72 year old presents with transient L HP. Now resolved    PMHX: stroke, HTN HLD    Today at 1300 developed L HP  Now resolved  CT head no acute changes      Woke up with symptoms?: no  Last known normal:    1300    Recent bleeding noted: no  Does the patient take any Blood Thinners? yes  Medications: Antiplatelets:  aspirin      Past Medical History: hypertension, hyperlipidemia and stroke    Past Surgical History: no major surgeries within the last 2 weeks    Family History: no relevant history    Social History: former smoker    Allergies: Losartan-Hydrochlorothiazide  Ace Inhibitors  Amlodipine  Azithromycin  Nebivolol      Review of Systems   Musculoskeletal: Positive " "for gait problem.   Neurological: Positive for facial asymmetry, speech difficulty and weakness.   All other systems reviewed and are negative.    Objective:   Vitals: Blood pressure 131/77, pulse 86, temperature 98.4 °F (36.9 °C), temperature source Oral, resp. rate 20, height 5' 10" (1.778 m), weight 86.8 kg (191 lb 6.4 oz), SpO2 97 %.  CT READ: Yes  No hemmorhage. No mass effect. No early infarct signs.     Physical Exam   Constitutional: He appears well-developed.   HENT:   Head: Normocephalic.   Eyes: Pupils are equal, round, and reactive to light.   Neck: Normal range of motion.   Pulmonary/Chest: Effort normal.   Neurological: He is alert.   Psychiatric: He has a normal mood and affect.             Recommended the emergency room physician to have a brief discussion with the patient and/or family if available regarding the risks and benefits of treatment, and to briefly document the occurrence of that discussion in his clinical encounter note.     The attending portion of this evaluation, treatment, and documentation was performed per Sanju Carlos MD via audiovisual.    Billing code:  (non-intervention mild to moderate stroke, TIA, some mimics)    · This patient has a critical neurological condition/illness, with some potential for high morbidity and mortality.  · There is a moderate probability for acute neurological change leading to clinical and possibly life-threatening deterioration requiring highest level of physician preparedness for urgent intervention.  · Care was coordinated with other physicians involved in the patient's care.  · Radiologic studies and laboratory data were reviewed and interpreted, and plan of care was re-assessed based on the results.  · Diagnosis, treatment options and prognosis may have been discussed with the patient and/or family members or caregiver.      Consult End Time: 2:52 PM     Sanju Carlos MD  [facility]  Vascular Neurology   [facility]

## 2018-06-18 VITALS
HEIGHT: 70 IN | HEART RATE: 91 BPM | DIASTOLIC BLOOD PRESSURE: 82 MMHG | WEIGHT: 191.38 LBS | BODY MASS INDEX: 27.4 KG/M2 | SYSTOLIC BLOOD PRESSURE: 128 MMHG | OXYGEN SATURATION: 94 % | TEMPERATURE: 98 F | RESPIRATION RATE: 18 BRPM

## 2018-06-18 LAB
ANION GAP SERPL CALC-SCNC: 7 MMOL/L
BASOPHILS # BLD AUTO: 0.03 K/UL
BASOPHILS NFR BLD: 0.6 %
BUN SERPL-MCNC: 17 MG/DL
CALCIUM SERPL-MCNC: 8.9 MG/DL
CHLORIDE SERPL-SCNC: 106 MMOL/L
CHOLEST SERPL-MCNC: 118 MG/DL
CHOLEST/HDLC SERPL: 4.1 {RATIO}
CO2 SERPL-SCNC: 28 MMOL/L
CREAT SERPL-MCNC: 1 MG/DL
DIFFERENTIAL METHOD: ABNORMAL
EOSINOPHIL # BLD AUTO: 0.4 K/UL
EOSINOPHIL NFR BLD: 7.8 %
ERYTHROCYTE [DISTWIDTH] IN BLOOD BY AUTOMATED COUNT: 14.5 %
ERYTHROCYTE [SEDIMENTATION RATE] IN BLOOD BY WESTERGREN METHOD: 8 MM/HR
EST. GFR  (AFRICAN AMERICAN): >60 ML/MIN/1.73 M^2
EST. GFR  (NON AFRICAN AMERICAN): >60 ML/MIN/1.73 M^2
ESTIMATED AVG GLUCOSE: 120 MG/DL
ESTIMATED PA SYSTOLIC PRESSURE: 60.22
GLUCOSE SERPL-MCNC: 95 MG/DL
HBA1C MFR BLD HPLC: 5.8 %
HCT VFR BLD AUTO: 39.7 %
HDLC SERPL-MCNC: 29 MG/DL
HDLC SERPL: 24.6 %
HGB BLD-MCNC: 12.6 G/DL
LDLC SERPL CALC-MCNC: 68.8 MG/DL
LYMPHOCYTES # BLD AUTO: 1.1 K/UL
LYMPHOCYTES NFR BLD: 20.3 %
MAGNESIUM SERPL-MCNC: 2 MG/DL
MCH RBC QN AUTO: 28.4 PG
MCHC RBC AUTO-ENTMCNC: 31.7 G/DL
MCV RBC AUTO: 89 FL
MITRAL VALVE REGURGITATION: ABNORMAL
MONOCYTES # BLD AUTO: 0.5 K/UL
MONOCYTES NFR BLD: 8.5 %
NEUTROPHILS # BLD AUTO: 3.3 K/UL
NEUTROPHILS NFR BLD: 62.8 %
NONHDLC SERPL-MCNC: 89 MG/DL
PHOSPHATE SERPL-MCNC: 4.1 MG/DL
PLATELET # BLD AUTO: 126 K/UL
PMV BLD AUTO: 11.6 FL
POTASSIUM SERPL-SCNC: 4.1 MMOL/L
RBC # BLD AUTO: 4.44 M/UL
RETIRED EF AND QEF - SEE NOTES: 35 (ref 55–65)
SODIUM SERPL-SCNC: 141 MMOL/L
TRICUSPID VALVE REGURGITATION: ABNORMAL
TRIGL SERPL-MCNC: 101 MG/DL
TROPONIN I SERPL DL<=0.01 NG/ML-MCNC: 0.01 NG/ML
TROPONIN I SERPL DL<=0.01 NG/ML-MCNC: 0.02 NG/ML
WBC # BLD AUTO: 5.27 K/UL

## 2018-06-18 PROCEDURE — G8980 MOBILITY D/C STATUS: HCPCS | Mod: CH

## 2018-06-18 PROCEDURE — 83036 HEMOGLOBIN GLYCOSYLATED A1C: CPT

## 2018-06-18 PROCEDURE — 94640 AIRWAY INHALATION TREATMENT: CPT

## 2018-06-18 PROCEDURE — 99900038 HC OT GENERIC THERAPY SCREENING (STAT)

## 2018-06-18 PROCEDURE — G0378 HOSPITAL OBSERVATION PER HR: HCPCS

## 2018-06-18 PROCEDURE — A9585 GADOBUTROL INJECTION: HCPCS | Performed by: INTERNAL MEDICINE

## 2018-06-18 PROCEDURE — 25000242 PHARM REV CODE 250 ALT 637 W/ HCPCS: Performed by: INTERNAL MEDICINE

## 2018-06-18 PROCEDURE — G8978 MOBILITY CURRENT STATUS: HCPCS | Mod: CH

## 2018-06-18 PROCEDURE — 83735 ASSAY OF MAGNESIUM: CPT

## 2018-06-18 PROCEDURE — 84100 ASSAY OF PHOSPHORUS: CPT

## 2018-06-18 PROCEDURE — 80048 BASIC METABOLIC PNL TOTAL CA: CPT

## 2018-06-18 PROCEDURE — 93306 TTE W/DOPPLER COMPLETE: CPT

## 2018-06-18 PROCEDURE — 84484 ASSAY OF TROPONIN QUANT: CPT

## 2018-06-18 PROCEDURE — 85025 COMPLETE CBC W/AUTO DIFF WBC: CPT

## 2018-06-18 PROCEDURE — 36415 COLL VENOUS BLD VENIPUNCTURE: CPT

## 2018-06-18 PROCEDURE — 84484 ASSAY OF TROPONIN QUANT: CPT | Mod: 91

## 2018-06-18 PROCEDURE — 93306 TTE W/DOPPLER COMPLETE: CPT | Mod: 26,,, | Performed by: INTERNAL MEDICINE

## 2018-06-18 PROCEDURE — 25500020 PHARM REV CODE 255: Performed by: INTERNAL MEDICINE

## 2018-06-18 PROCEDURE — 85651 RBC SED RATE NONAUTOMATED: CPT

## 2018-06-18 PROCEDURE — 25000003 PHARM REV CODE 250: Performed by: NURSE PRACTITIONER

## 2018-06-18 PROCEDURE — 94761 N-INVAS EAR/PLS OXIMETRY MLT: CPT

## 2018-06-18 PROCEDURE — 97161 PT EVAL LOW COMPLEX 20 MIN: CPT

## 2018-06-18 PROCEDURE — G8979 MOBILITY GOAL STATUS: HCPCS | Mod: CH

## 2018-06-18 PROCEDURE — 97116 GAIT TRAINING THERAPY: CPT

## 2018-06-18 PROCEDURE — 99900037 HC PT THERAPY SCREENING (STAT)

## 2018-06-18 RX ORDER — GADOBUTROL 604.72 MG/ML
8 INJECTION INTRAVENOUS
Status: COMPLETED | OUTPATIENT
Start: 2018-06-18 | End: 2018-06-18

## 2018-06-18 RX ORDER — EPINEPHRINE 0.3 MG/.3ML
1 INJECTION SUBCUTANEOUS
Qty: 2 DEVICE | Refills: 1 | OUTPATIENT
Start: 2018-06-18 | End: 2018-10-23

## 2018-06-18 RX ORDER — CLOPIDOGREL BISULFATE 75 MG/1
75 TABLET ORAL DAILY
Qty: 30 TABLET | Refills: 1 | Status: SHIPPED | OUTPATIENT
Start: 2018-06-18 | End: 2018-10-23

## 2018-06-18 RX ADMIN — BUDESONIDE 0.5 MG: 0.5 SUSPENSION RESPIRATORY (INHALATION) at 07:06

## 2018-06-18 RX ADMIN — AMOXICILLIN AND CLAVULANATE POTASSIUM 1 TABLET: 875; 125 TABLET, FILM COATED ORAL at 10:06

## 2018-06-18 RX ADMIN — TAMSULOSIN HYDROCHLORIDE 0.4 MG: 0.4 CAPSULE ORAL at 10:06

## 2018-06-18 RX ADMIN — GADOBUTROL 8 ML: 604.72 INJECTION INTRAVENOUS at 09:06

## 2018-06-18 RX ADMIN — PANTOPRAZOLE SODIUM 40 MG: 40 TABLET, DELAYED RELEASE ORAL at 10:06

## 2018-06-18 RX ADMIN — ASPIRIN 81 MG: 81 TABLET, COATED ORAL at 10:06

## 2018-06-18 RX ADMIN — ARFORMOTEROL TARTRATE 15 MCG: 15 SOLUTION RESPIRATORY (INHALATION) at 07:06

## 2018-06-18 NOTE — HPI
Patient is transfer from [facility] for further evaluation of left sided weakness. Patient reports that he is watching TV around 1:00 and couldn't get up from his chair and he couldn't lift left arm and left leg. Symptoms resolved by time he arrived to Er. No modifying factors Associated symptoms dizziness, congestion. Patients ER workup neg thus far. Negative for acute intracranial abnormality. CT Head with no acute intracranial findings but findings note Mild chronic small vessel ischemic changes periventricular white matter.  Remote lacunar insult right basal ganglia.  Intracranial calcified plaque skullbase vasculature. Moderate mucosal thickening throughout the ethmoids. Patient transferred to Hospital Medicine and placed in obs. To note patient recently had a guardado due to urinary retention, which was removed within the last 2 weeks per patient. Patient denies any urinary symptoms at this time.

## 2018-06-18 NOTE — ASSESSMENT & PLAN NOTE
Chronicity unknown, will treat as acute; ?if influencing neurological complaints  Augmentin 875 BID x 7 days   OP follow UP.   Flonase

## 2018-06-18 NOTE — ASSESSMENT & PLAN NOTE
-ASA 325mg in ER  -Low dose ASA daily   -Counseled on risk factors and need for compliance with medical therapy  -PT/OT eval  -CT Head Reviewed: no acute intracranial abnormality but notation of moderate mucosal thicking throught the ethmoids  -Additional diagnostics: MRI, US bilateral Carotids, ECHO  -Check TSH neg  -check electrolytes   -Check A1C, lipids  -check UA  -Consider Neuro Consult pending obs course and diagnostics   -OP follow up with PCP, Cardiology, Neuro for additional evaluation, routine follow ups to assist in risk reduction.   -Monitor closely

## 2018-06-18 NOTE — PLAN OF CARE
Problem: Patient Care Overview  Goal: Plan of Care Review  Outcome: Ongoing (interventions implemented as appropriate)  POC reviewed with patient, verbalized understanding. Pt remains free from falls. Fall precautions in place. NS on cardiac monitor. VSS. No other complaints at this time. Call bell w/in reach. Reminded to call for assistance. Orthostatic BP was negative during my shift.

## 2018-06-18 NOTE — H&P
"[facility] Hospital Medicine  History & Physical    Patient Name: Jose Clark Jr.  MRN: [id number]  Admission Date: 6/17/2018  Attending Physician: Hector Martin MD  Primary Care Provider: Rudolph Alves MD         Patient information was obtained from patient, past medical records and ER records.     Subjective:     Principal Problem:Transient cerebral ischemia    Chief Complaint:   Chief Complaint   Patient presents with    Altered Mental Status     per wife pt was home alone and had episode of stumbling and not being able to move left arm. she states he is "moving slow"        HPI: Patient is transfer from [facility] for further evaluation of left sided weakness. Patient reports that he is watching TV around 1:00 and couldn't get up from his chair and he couldn't lift left arm and left leg. Symptoms resolved by time he arrived to Er. No modifying factors Associated symptoms dizziness, congestion. Patients ER workup neg thus far. Negative for acute intracranial abnormality. CT Head with no acute intracranial findings but findings note Mild chronic small vessel ischemic changes periventricular white matter.  Remote lacunar insult right basal ganglia.  Intracranial calcified plaque skullbase vasculature. Moderate mucosal thickening throughout the ethmoids. Patient transferred to Hospital Medicine and placed in obs. To note patient recently had a guardado due to urinary retention, which was removed within the last 2 weeks per patient. Patient denies any urinary symptoms at this time.        Past Medical History:   Diagnosis Date    Angio-edema     Dougherty esophagus     COPD (chronic obstructive pulmonary disease)     GERD (gastroesophageal reflux disease)     Hyperlipemia     Hypertension     PLMD (periodic limb movement disorder)     Sensorineural deafness     Stroke     TIA (transient ischemic attack)        Past Surgical History:   Procedure Laterality Date    CYST REMOVAL      " right facial cheek     HERNIA REPAIR         Review of patient's allergies indicates:   Allergen Reactions    Losartan-hydrochlorothiazide Shortness Of Breath     Angioedema    Ace inhibitors      Facial swelling    Amlodipine      swelling    Azithromycin      diarrhea    Nebivolol        No current facility-administered medications on file prior to encounter.      Current Outpatient Prescriptions on File Prior to Encounter   Medication Sig    aspirin (ECOTRIN) 81 MG EC tablet Take 81 mg by mouth once daily.    budesonide-formoterol 160-4.5 mcg (SYMBICORT) 160-4.5 mcg/actuation HFAA Inhale 2 puffs into the lungs every 12 (twelve) hours. Controller    ergocalciferol, vitamin D2, 400 unit Tab Take 1 tablet by mouth once daily.    esomeprazole (NEXIUM) 40 MG capsule Take 40 mg by mouth.    famotidine (PEPCID) 20 MG tablet Take 20 mg by mouth once daily.    methyldopa (ALDOMET) 250 MG tablet Take 250 mg by mouth every 12 (twelve) hours.    simvastatin (ZOCOR) 40 MG tablet Take 40 mg by mouth every evening.    tamsulosin (FLOMAX) 0.4 mg Cp24 Take 1 capsule (0.4 mg total) by mouth once daily.    albuterol sulfate 90 mcg/actuation AePB Inhale into the lungs. Rescue    albuterol-ipratropium 2.5mg-0.5mg/3mL (DUONEB) 0.5 mg-3 mg(2.5 mg base)/3 mL nebulizer solution Take 3 mLs by nebulization every 6 (six) hours as needed for Wheezing. Rescue    chlorproMAZINE (THORAZINE) 50 MG tablet Take 1 tablet (50 mg total) by mouth 4 (four) times daily as needed.    diazePAM (VALIUM) 5 MG tablet Take 1 tablet (5 mg total) by mouth every 6 (six) hours as needed (Hiccups).    epinephrine (EPIPEN) 0.3 mg/0.3 mL (1:1,000) AtIn Inject 0.3 mLs (0.3 mg total) into the muscle as needed.    terazosin (HYTRIN) 1 MG capsule Take 1 mg by mouth every evening.     Family History     Reviewed and not Pertinent         Social History Main Topics    Smoking status: Former Smoker     Packs/day: 2.00     Years: 35.00     Types:  Cigarettes     Quit date: 2/18/1978    Smokeless tobacco: Former User    Alcohol use No    Drug use: No    Sexual activity: Not on file     Review of Systems   Constitutional: Positive for fatigue. Negative for chills, diaphoresis and fever.   HENT: Positive for congestion. Negative for sinus pressure, sore throat and voice change.    Eyes: Negative for photophobia and visual disturbance.   Respiratory: Negative for cough, shortness of breath, wheezing and stridor.    Cardiovascular: Negative for chest pain and leg swelling.   Gastrointestinal: Negative for abdominal distention, abdominal pain, constipation, diarrhea, nausea and vomiting.   Endocrine: Negative for polydipsia, polyphagia and polyuria.   Genitourinary: Negative for decreased urine volume, difficulty urinating, discharge, dysuria, flank pain, frequency, penile pain, testicular pain and urgency.   Musculoskeletal: Negative for back pain, joint swelling, neck pain and neck stiffness.   Skin: Negative for color change and rash.   Allergic/Immunologic: Negative for immunocompromised state.   Neurological: Positive for weakness. Negative for dizziness, syncope, numbness and headaches.   Hematological: Does not bruise/bleed easily.   Psychiatric/Behavioral: Negative for agitation, behavioral problems and confusion.     Objective:     Vital Signs (Most Recent):  Temp: 97.6 °F (36.4 °C) (06/17/18 1845)  Pulse: 89 (06/17/18 1845)  Resp: 18 (06/17/18 1845)  BP: 135/82 (06/17/18 1845)  SpO2: 96 % (06/17/18 1845) Vital Signs (24h Range):  Temp:  [97.6 °F (36.4 °C)-98.4 °F (36.9 °C)] 97.6 °F (36.4 °C)  Pulse:  [84-89] 89  Resp:  [18-30] 18  SpO2:  [96 %-99 %] 96 %  BP: (128-137)/(77-93) 135/82     Weight: 86.8 kg (191 lb 6.4 oz)  Body mass index is 27.46 kg/m².    Physical Exam   Constitutional: He is oriented to person, place, and time. He appears well-developed and well-nourished. No distress.   Pleasant elderly male      HENT:   Head: Normocephalic and  atraumatic.   Nose: Nose normal.   Eyes: Conjunctivae and EOM are normal. Pupils are equal, round, and reactive to light. No scleral icterus.   Neck: Normal range of motion. Neck supple. No tracheal deviation present.   Cardiovascular: Normal rate, regular rhythm, normal heart sounds and intact distal pulses.    No murmur heard.  Pulmonary/Chest: Effort normal and breath sounds normal. No stridor. No respiratory distress. He has no wheezes. He has no rales.   Abdominal: Soft. Bowel sounds are normal. He exhibits no distension. There is no tenderness. There is no guarding.   Genitourinary:   Genitourinary Comments: Check UA     Musculoskeletal: Normal range of motion. He exhibits no edema or deformity.   Neurological: He is alert and oriented to person, place, and time. No cranial nerve deficit or sensory deficit. He exhibits normal muscle tone. Coordination normal.   NIH 0     Skin: Skin is warm and dry. Capillary refill takes less than 2 seconds. No rash noted. He is not diaphoretic.   Psychiatric: He has a normal mood and affect. His behavior is normal. Judgment and thought content normal.   Nursing note and vitals reviewed.        CRANIAL NERVES     CN III, IV, VI   Pupils are equal, round, and reactive to light.  Extraocular motions are normal.        Significant Labs: All pertinent labs within the past 24 hours have been reviewed.  Results for orders placed or performed during the hospital encounter of 06/17/18   CBC W/ AUTO DIFFERENTIAL   Result Value Ref Range    WBC 5.60 3.90 - 12.70 K/uL    RBC 4.39 (L) 4.60 - 6.20 M/uL    Hemoglobin 12.6 (L) 14.0 - 18.0 g/dL    Hematocrit 38.5 (L) 40.0 - 54.0 %    MCV 88 82 - 98 fL    MCH 28.7 27.0 - 31.0 pg    MCHC 32.7 32.0 - 36.0 g/dL    RDW 14.3 11.5 - 14.5 %    Platelets 146 (L) 150 - 350 K/uL    MPV 11.7 9.2 - 12.9 fL    Gran # (ANC) 3.7 1.8 - 7.7 K/uL    Lymph # 1.0 1.0 - 4.8 K/uL    Mono # 0.6 0.3 - 1.0 K/uL    Eos # 0.3 0.0 - 0.5 K/uL    Baso # 0.03 0.00 - 0.20  K/uL    Gran% 65.5 38.0 - 73.0 %    Lymph% 17.9 (L) 18.0 - 48.0 %    Mono% 10.2 4.0 - 15.0 %    Eosinophil% 5.7 0.0 - 8.0 %    Basophil% 0.5 0.0 - 1.9 %    Differential Method Automated    Comprehensive metabolic panel   Result Value Ref Range    Sodium 138 136 - 145 mmol/L    Potassium 4.3 3.5 - 5.1 mmol/L    Chloride 106 95 - 110 mmol/L    CO2 24 23 - 29 mmol/L    Glucose 101 70 - 110 mg/dL    BUN, Bld 17 8 - 23 mg/dL    Creatinine 1.0 0.5 - 1.4 mg/dL    Calcium 8.8 8.7 - 10.5 mg/dL    Total Protein 6.6 6.0 - 8.4 g/dL    Albumin 3.6 3.5 - 5.2 g/dL    Total Bilirubin 0.6 0.1 - 1.0 mg/dL    Alkaline Phosphatase 60 55 - 135 U/L    AST 14 10 - 40 U/L    ALT 11 10 - 44 U/L    Anion Gap 8 8 - 16 mmol/L    eGFR if African American >60.0 >60 mL/min/1.73 m^2    eGFR if non African American >60.0 >60 mL/min/1.73 m^2   Protime-INR   Result Value Ref Range    Prothrombin Time 11.2 9.0 - 12.5 sec    INR 1.1 0.8 - 1.2   TSH   Result Value Ref Range    TSH 0.879 0.400 - 4.000 uIU/mL   APTT   Result Value Ref Range    aPTT 26.6 21.0 - 32.0 sec   POCT glucose   Result Value Ref Range    POCT Glucose 94 70 - 110 mg/dL       Significant Imaging: I have reviewed all pertinent imaging results/findings within the past 24 hours.   Imaging Results          X-Ray Chest AP Portable (Final result)  Result time 06/17/18 15:10:36    Final result by Samuel Martínez MD (06/17/18 15:10:36)                 Impression:      Stable cardiomegaly.  Tiny bilateral pleural effusions      Electronically signed by: Samuel Martínez MD  Date:    06/17/2018  Time:    15:10             Narrative:    EXAMINATION:  XR CHEST AP PORTABLE    CLINICAL HISTORY:  Stroke;    TECHNIQUE:  Single frontal view of the chest was performed.    COMPARISON:  04/04/2017    FINDINGS:  Stable cardiomegaly.  Tortuous aorta.    The lungs are clear.    Blunting bilateral costophrenic sulci.                               CT Head Without Contrast (Final result)  Result time 06/17/18  14:37:41    Final result by Samuel Martínez MD (06/17/18 14:37:41)                 Impression:      Negative for acute intracranial abnormality.    All CT scans at this facility use dose modulation, iterative reconstruction and/or weight based dosing when appropriate to reduce radiation dose to as low as reasonably achievable.      Electronically signed by: Samuel Martínez MD  Date:    06/17/2018  Time:    14:37             Narrative:    EXAMINATION:  CT HEAD WITHOUT CONTRAST    CLINICAL HISTORY:  Focal neuro deficit, new, fixed or worsening, <6 hours;    TECHNIQUE:  Axial CT images obtained throughout the head without intravenous contrast.    COMPARISON:  None.    FINDINGS:  Negative for acute hemorrhage, mass effect, extraaxial collection, hydrocephalus.    Mild chronic small vessel ischemic changes periventricular white matter.  Remote lacunar insult right basal ganglia.  Intracranial calcified plaque skullbase vasculature.    Moderate mucosal thickening throughout the ethmoids.    The calvarium is unremarkable with no fractures.                              I have personally reviewed the patients labs, imaging, ekg (SR PVC noted, hx of PVC in past EKG. No obvious acute changes)  and discussed the patient case in detail with the Er provider      Assessment/Plan:     * Transient cerebral ischemia    -ASA 325mg in ER  -Low dose ASA daily   -Counseled on risk factors and need for compliance with medical therapy  -PT/OT eval  -CT Head Reviewed: no acute intracranial abnormality but notation of moderate mucosal thicking throught the ethmoids  -Additional diagnostics: MRI, US bilateral Carotids, ECHO  -Check TSH neg  -check electrolytes   -Check A1C, lipids  -check UA  -Consider Neuro Consult pending obs course and diagnostics   -OP follow up with PCP, Cardiology, Neuro for additional evaluation, routine follow ups to assist in risk reduction.   -Monitor closely             Ethmoid sinusitis    Chronicity unknown, will  treat as acute; ?if influencing neurological complaints  Augmentin 875 BID x 7 days   OP follow UP.   Flonase         Hypertension    Vitals stable at this time  Allow for permissive /120 until stroke r/o         Hyperlipidemia    Continue statin  Check lipids        Benign prostatic hyperplasia with urinary retention      Continue flomax  Monitor urine output  OP follow up with Urology         COPD (chronic obstructive pulmonary disease)    Stable  Continue neb/ICS  Flonase.  No histamines as has BPH           VTE Risk Mitigation         Ordered     enoxaparin injection 40 mg  Daily      06/17/18 1957     Place CONSTANCE hose  Until discontinued      06/17/18 1957     Place sequential compression device  Until discontinued      06/17/18 1957     IP VTE HIGH RISK PATIENT  Once      06/17/18 1957     Place CONSTANCE hose  Until discontinued      06/17/18 4711             Jony Craig NP  Department of Hospital Medicine   [facility]

## 2018-06-18 NOTE — NURSING
Discharged orders received and reviewed with family and pt. Pt instructed when to take each medication next dose. Pt given prescriptions.  IV removed, telemetry removed. Pt assisted with dressing by staff. Pt transported to [facility] via w/c by staff for family to transport home.

## 2018-06-18 NOTE — PT/OT/SLP EVAL
Physical Therapy Evaluation and Discharge Note    Patient Name:  Jose Clark Jr.   MRN:  [id number]    Recommendations:     Discharge Recommendations:  home   Discharge Equipment Recommendations: none   Barriers to discharge: None    Assessment:     Jose Clark Jr. is a 72 y.o. male admitted with a medical diagnosis of Transient cerebral ischemia. .  At this time, patient is functioning at their prior level of function and does not require further acute PT services.     Recent Surgery: * No surgery found *      Plan:     During this hospitalization, patient does not require further acute PT services.  Please re-consult if situation changes.     Plan of Care Reviewed with: patient, spouse    Subjective     Communicated with NURSE PARVIZ AND EPIC CHART REVIEW prior to session.  Patient found SUP IN BED upon PT entry to room, agreeable to evaluation.        Pain/Comfort:  · Pain Rating 1: 0/10  · Pain Rating Post-Intervention 1: 0/10        Living Environment:  PT LIVES WITH HIS WIFE IN A MOBILE HOME WITH 9 STEPS TO ENTER WITH RAILS ON BOTH SIDES. PT'S WIFE IS WITH HIM 24 HOURS/ DAY.   Prior to admission, patients level of function was I IN WALKING COMMUNITY DISTANCES, DRIVING, AND BATHING/DRESSING.  Patient has the following equipment: none.  DME owned (not currently used): none.  Upon discharge, patient will have assistance from WIFE.    Objective:     Patient found with: telemetry     General Precautions: Standard, fall   Orthopedic Precautions:N/A   Braces: N/A     Exams:  · Cognitive Exam:  Patient is oriented to Person, Place, Time and Situation and follows 100% of MULTISTEP commands   · Gross Motor Coordination:  WFL  · Sensation:    · -       Intact  · RLE ROM: WNL  · RLE Strength: 5/5  · LLE ROM: WNL  · LLE Strength: 5/5    Functional Mobility:  · Bed Mobility:     · Supine to Sit: independence  · Transfers:     · Sit to Stand:  independence with no AD  · Gait: 150' WITH NO AD AND NO GROSS LOB, indep    · Balance: GOOD  · Stairs:  Pt ascended/descended 12 Stairs with No Assistive Device with ONE HANDRAIL with Independent.     AM-PAC 6 CLICK MOBILITY  Total Score:24     Therapeutic Activities and Exercises:   PT PERFORMED GT, NEGOTIATED STAIRS, AND PARTICIPATED IN FUNCTIONAL MOBILITY WITH INDEPENDENCE.    Patient left SITTING ON EOB with all lines intact, call button in reach, NURSE  notified and WIFE present.    GOALS:    Physical Therapy Goals     Not on file                History:     Past Medical History:   Diagnosis Date    Angio-edema     Dougherty esophagus     COPD (chronic obstructive pulmonary disease)     GERD (gastroesophageal reflux disease)     Hyperlipemia     Hypertension     PLMD (periodic limb movement disorder)     Sensorineural deafness     Stroke     TIA (transient ischemic attack)        Past Surgical History:   Procedure Laterality Date    CYST REMOVAL      right facial cheek     HERNIA REPAIR         Clinical Decision Making:     History  Co-morbidities and personal factors that may impact the plan of care Examination  Body Structures and Functions, activity limitations and participation restrictions that may impact the plan of care Clinical Presentation   Decision Making/ Complexity Score   Co-morbidities:   [] Time since onset of injury / illness / exacerbation  [] Status of current condition  []Patient's cognitive status and safety concerns    [] Multiple Medical Problems (see med hx)  Personal Factors:   [] Patient's age  [] Prior Level of function   [] Patient's home situation (environment and family support)  [] Patient's level of motivation  [] Expected progression of patient      HISTORY:(criteria)    [] 74894 - no personal factors/history    [] 09543 - has 1-2 personal factor/comorbidity     [] 41155 - has >3 personal factor/comorbidity     Body Regions:  [] Objective examination findings  [] Head     []  Neck  [] Trunk   [] Upper Extremity  [] Lower Extremity    Body  Systems:  [] For communication ability, affect, cognition, language, and learning style: the assessment of the ability to make needs known, consciousness, orientation (person, place, and time), expected emotional /behavioral responses, and learning preferences (eg, learning barriers, education  needs)  [] For the neuromuscular system: a general assessment of gross coordinated movement (eg, balance, gait, locomotion, transfers, and transitions) and motor function  (motor control and motor learning)  [] For the musculoskeletal system: the assessment of gross symmetry, gross range of motion, gross strength, height, and weight  [] For the integumentary system: the assessment of pliability(texture), presence of scar formation, skin color, and skin integrity  [] For cardiovascular/pulmonary system: the assessment of heart rate, respiratory rate, blood pressure, and edema     Activity limitations:    [] Patient's cognitive status and saf ety concerns          [] Status of current condition      [] Weight bearing restriction  [] Cardiopulmunary Restriction    Participation Restrictions:   [] Goals and goal agreement with the patient     [] Rehab potential (prognosis) and probable outcome      Examination of Body System: (criteria)    [] 05234 - addressing 1-2 elements    [] 51558 - addressing a total of 3 or more elements     [] 70961 -  Addressing a total of 4 or more elements         Clinical Presentation: (criteria)  Choose one     On examination of body system using standardized tests and measures patient presents with (CHOOSE ONE) elements from any of the following: body structures and functions, activity limitations, and/or participation restrictions.  Leading to a clinical presentation that is considered (CHOOSE ONE)                              Clinical Decision Making  (Eval Complexity):  Choose One     Time Tracking:     PT Received On: 06/18/18  PT Start Time: 0900     PT Stop Time: 0925  PT Total Time (min): 25  min     Billable Minutes: Evaluation 15 and Gait Training 10     PT WAS ADVISED TO CALL FOR ALL NEEDS. PT WAS AGREEABLE.     Carlos Eduardo Antoine, SPT  06/18/2018

## 2018-06-18 NOTE — HOSPITAL COURSE
Patient admitted for TIA. CT Head with no acute intracranial findings. Vascular neurology consulted in ED. Recommended ASA, Plavix, and Statin. MRI brain showed No evidence for acute CVA. Mild chronic small vessel ischemic changes of the white matter and old right basal ganglia lacunar infarct. 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 Bilateral carotid US showed No hemodynamically significant stenosis. 20-50% stenosis bilaterally with heterogeneous plaque. Discussed with the patient and spouse the findings of MRI in detail regarding right parotid gland mass and the importance of follow-up with ENT, verbalized understanding. Symptoms resolved. Case discussed with torey Coreas to discharge patient. Home meds reconciled. New prescription given for Plavix. Patient to follow-up with PCP in 3 days for hospital follow-up. Patient also to follow-up in 3 days with ENT outpatient of further evaluation of right parotid mass. Follow-up with Neurology outpatient. Patient seen and examined on the date of discharge and found suitable for discharge.

## 2018-06-18 NOTE — PT/OT/SLP PROGRESS
Speech Language Pathology      Jose Clark Jr.  MRN: [id number]    ST received eval order and initiated via chart review and nursing interview.  NP, Edna, reported pt being discharged and ST order being cancelled.   Eli Taylor, DALY-SLP

## 2018-06-18 NOTE — DISCHARGE SUMMARY
[facility] Hospital Medicine  Discharge Summary      Patient Name: Jose Clark Jr.  MRN: [id number]  Admission Date: 6/17/2018  Hospital Length of Stay: 0 days  Discharge Date and Time:  06/18/2018 2:17 PM  Attending Physician: Samuel Beckham MD   Discharging Provider: Mayda Brasher NP  Primary Care Provider: Rudolph Alves MD      HPI:   Patient is transfer from [facility] for further evaluation of left sided weakness. Patient reports that he is watching TV around 1:00 and couldn't get up from his chair and he couldn't lift left arm and left leg. Symptoms resolved by time he arrived to Er. No modifying factors Associated symptoms dizziness, congestion. Patients ER workup neg thus far. Negative for acute intracranial abnormality. CT Head with no acute intracranial findings but findings note Mild chronic small vessel ischemic changes periventricular white matter.  Remote lacunar insult right basal ganglia.  Intracranial calcified plaque skullbase vasculature. Moderate mucosal thickening throughout the ethmoids. Patient transferred to [facility] and placed in obs. To note patient recently had a guardado due to urinary retention, which was removed within the last 2 weeks per patient. Patient denies any urinary symptoms at this time.        * No surgery found *      Hospital Course:   Patient admitted for TIA. CT Head with no acute intracranial findings. Vascular neurology consulted in ED. Recommended ASA, Plavix, and Statin. MRI brain showed No evidence for acute CVA. Mild chronic small vessel ischemic changes of the white matter and old right basal ganglia lacunar infarct. 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 Bilateral carotid US showed No hemodynamically significant stenosis. 20-50% stenosis bilaterally with heterogeneous plaque. Discussed with the patient and spouse the findings of MRI in detail regarding right  parotid gland mass and the importance of follow-up with ENT, verbalized understanding. Symptoms resolved. Case discussed with torey Coreas to discharge patient. Home meds reconciled. New prescription given for Plavix. Patient to follow-up with PCP in 3 days for hospital follow-up. Patient also to follow-up in 3 days with ENT outpatient of further evaluation of right parotid mass. Follow-up with Neurology outpatient. Patient seen and examined on the date of discharge and found suitable for discharge.      Consults:   Consults         Status Ordering Provider     Inpatient consult to Registered Dietitian/Nutritionist  Once     Provider:  (Not yet assigned)    Acknowledged RULA THORPE     Inpatient consult to Telemedicine-Stroke  Once     Provider:  Sanju Carlos MD    Completed HERMELINDA ENAMORADO     IP consult to case management/social work  Once     Provider:  (Not yet assigned)    Completed RULA THORPE          No new Assessment & Plan notes have been filed under this hospital service since the last note was generated.  Service: Hospital Medicine    Final Active Diagnoses:    Diagnosis Date Noted POA    PRINCIPAL PROBLEM:  Transient cerebral ischemia [G45.9] 06/17/2018 Yes    Ethmoid sinusitis [J32.2] 06/17/2018 Yes    Benign prostatic hyperplasia with urinary retention [N40.1, R33.8] 06/04/2018 Yes    COPD (chronic obstructive pulmonary disease) [J44.9] 02/17/2016 Yes    Hypertension [I10] 12/18/2012 Yes    Hyperlipidemia [E78.5] 12/18/2012 Yes      Problems Resolved During this Admission:    Diagnosis Date Noted Date Resolved POA       Discharged Condition: stable    Disposition: Home or Self Care    Follow Up:  Follow-up Information     Rudolph Alves MD In 3 days.    Specialty:  Internal Medicine  Why:  hospital follow-up   Contact information:  [address] [facility] [address]  [phone]             Carlos Etseves MD In 1 week.    Specialty:  Neurology  Why:   hospital follow-up for TIA   Contact information:  [address]  [place] [address] [phone]             Scott Mckay MD In 3 days.    Specialties:  Otolaryngology, Internal Medicine  Why:  hospital follow-up and right parotid mass   Contact information:  [address] [place] [id number]  [phone]                 Patient Instructions:     Activity as tolerated     Notify your health care provider if you experience any of the following:  persistent dizziness, light-headedness, or visual disturbances     Notify your health care provider if you experience any of the following:  increased confusion or weakness         Significant Diagnostic Studies: Labs:   BMP:   Recent Labs  Lab 06/17/18  1439 06/18/18  0454    95    141   K 4.3 4.1    106   CO2 24 28   BUN 17 17   CREATININE 1.0 1.0   CALCIUM 8.8 8.9   MG  --  2.0   , CMP   Recent Labs  Lab 06/17/18  1439 06/18/18  0454    141   K 4.3 4.1    106   CO2 24 28    95   BUN 17 17   CREATININE 1.0 1.0   CALCIUM 8.8 8.9   PROT 6.6  --    ALBUMIN 3.6  --    BILITOT 0.6  --    ALKPHOS 60  --    AST 14  --    ALT 11  --    ANIONGAP 8 7*   ESTGFRAFRICA >60.0 >60   EGFRNONAA >60.0 >60   , CBC   Recent Labs  Lab 06/17/18  1439 06/18/18  0454   WBC 5.60 5.27   HGB 12.6* 12.6*   HCT 38.5* 39.7*   * 126*    and All labs within the past 24 hours have been reviewed    Pending Diagnostic Studies:     None         Medications:  Reconciled Home Medications:      Medication List      START taking these medications    clopidogrel 75 mg tablet  Commonly known as:  PLAVIX  Take 1 tablet (75 mg total) by mouth once daily.        CONTINUE taking these medications    aspirin 81 MG EC tablet  Commonly known as:  ECOTRIN  Take 81 mg by mouth once daily.     budesonide-formoterol 160-4.5 mcg 160-4.5 mcg/actuation Hfaa  Commonly known as:  SYMBICORT  Inhale 2 puffs into the lungs every 12 (twelve) hours. Controller      chlorproMAZINE 50 MG tablet  Commonly known as:  THORAZINE  Take 1 tablet (50 mg total) by mouth 4 (four) times daily as needed.     EPINEPHrine 0.3 mg/0.3 mL Atin  Commonly known as:  EPIPEN  Inject 0.3 mLs (0.3 mg total) into the muscle as needed.     ergocalciferol (vitamin D2) 400 unit Tab  Take 1 tablet by mouth once daily.     esomeprazole 40 MG capsule  Commonly known as:  NEXIUM  Take 40 mg by mouth.     famotidine 20 MG tablet  Commonly known as:  PEPCID  Take 20 mg by mouth once daily.     * methyldopa 250 MG tablet  Commonly known as:  ALDOMET  Take 250 mg by mouth every 12 (twelve) hours.     * methyldopa 250 MG tablet  Commonly known as:  ALDOMET  Take 250 mg by mouth.     simvastatin 40 MG tablet  Commonly known as:  ZOCOR  Take 40 mg by mouth every evening.     tamsulosin 0.4 mg Cp24  Commonly known as:  FLOMAX  Take 1 capsule (0.4 mg total) by mouth once daily.     terazosin 1 MG capsule  Commonly known as:  HYTRIN  Take 1 mg by mouth every evening.        * This list has 2 medication(s) that are the same as other medications prescribed for you. Read the directions carefully, and ask your doctor or other care provider to review them with you.            STOP taking these medications    diazePAM 5 MG tablet  Commonly known as:  VALIUM            Indwelling Lines/Drains at time of discharge:   Lines/Drains/Airways          No matching active lines, drains, or airways          Time spent on the discharge of patient: 45 minutes  Patient was seen and examined on the date of discharge and determined to be suitable for discharge.         Mayda Brasher NP  Department of Hospital Medicine  [facility]

## 2018-06-18 NOTE — PT/OT/SLP PROGRESS
Occupational Therapy      Patient Name:  Jose Clark Jr.   MRN:  [id number]    eval initiated via chart review. Pt discharged in progress.    Esther Ruiz, OT  6/18/2018   1112

## 2018-06-18 NOTE — PT/OT/SLP PROGRESS
Physical Therapy      Patient Name:  Jose Clark Jr.   MRN:  [id number]    JOSTIN ROLLE INITIATED THIS AM VIA CHART REVIEW, PT CURRENTLY IN MRI, WILL COMPLETE JOSTIN ROLLE LATER THIS AM WHEN AVAILABLE    Louise Crawford, PT   6/18/2018  0815

## 2018-06-18 NOTE — SUBJECTIVE & OBJECTIVE
Past Medical History:   Diagnosis Date    Angio-edema     Dougherty esophagus     COPD (chronic obstructive pulmonary disease)     GERD (gastroesophageal reflux disease)     Hyperlipemia     Hypertension     PLMD (periodic limb movement disorder)     Sensorineural deafness     Stroke     TIA (transient ischemic attack)        Past Surgical History:   Procedure Laterality Date    CYST REMOVAL      right facial cheek     HERNIA REPAIR         Review of patient's allergies indicates:   Allergen Reactions    Losartan-hydrochlorothiazide Shortness Of Breath     Angioedema    Ace inhibitors      Facial swelling    Amlodipine      swelling    Azithromycin      diarrhea    Nebivolol        No current facility-administered medications on file prior to encounter.      Current Outpatient Prescriptions on File Prior to Encounter   Medication Sig    aspirin (ECOTRIN) 81 MG EC tablet Take 81 mg by mouth once daily.    budesonide-formoterol 160-4.5 mcg (SYMBICORT) 160-4.5 mcg/actuation HFAA Inhale 2 puffs into the lungs every 12 (twelve) hours. Controller    ergocalciferol, vitamin D2, 400 unit Tab Take 1 tablet by mouth once daily.    esomeprazole (NEXIUM) 40 MG capsule Take 40 mg by mouth.    famotidine (PEPCID) 20 MG tablet Take 20 mg by mouth once daily.    methyldopa (ALDOMET) 250 MG tablet Take 250 mg by mouth every 12 (twelve) hours.    simvastatin (ZOCOR) 40 MG tablet Take 40 mg by mouth every evening.    tamsulosin (FLOMAX) 0.4 mg Cp24 Take 1 capsule (0.4 mg total) by mouth once daily.    albuterol sulfate 90 mcg/actuation AePB Inhale into the lungs. Rescue    albuterol-ipratropium 2.5mg-0.5mg/3mL (DUONEB) 0.5 mg-3 mg(2.5 mg base)/3 mL nebulizer solution Take 3 mLs by nebulization every 6 (six) hours as needed for Wheezing. Rescue    chlorproMAZINE (THORAZINE) 50 MG tablet Take 1 tablet (50 mg total) by mouth 4 (four) times daily as needed.    diazePAM (VALIUM) 5 MG tablet Take 1 tablet (5 mg  total) by mouth every 6 (six) hours as needed (Hiccups).    epinephrine (EPIPEN) 0.3 mg/0.3 mL (1:1,000) AtIn Inject 0.3 mLs (0.3 mg total) into the muscle as needed.    terazosin (HYTRIN) 1 MG capsule Take 1 mg by mouth every evening.     Family History     None        Social History Main Topics    Smoking status: Former Smoker     Packs/day: 2.00     Years: 35.00     Types: Cigarettes     Quit date: 2/18/1978    Smokeless tobacco: Former User    Alcohol use No    Drug use: No    Sexual activity: Not on file     Review of Systems   Constitutional: Positive for fatigue. Negative for chills, diaphoresis and fever.   HENT: Positive for congestion. Negative for sinus pressure, sore throat and voice change.    Eyes: Negative for photophobia and visual disturbance.   Respiratory: Negative for cough, shortness of breath, wheezing and stridor.    Cardiovascular: Negative for chest pain and leg swelling.   Gastrointestinal: Negative for abdominal distention, abdominal pain, constipation, diarrhea, nausea and vomiting.   Endocrine: Negative for polydipsia, polyphagia and polyuria.   Genitourinary: Negative for decreased urine volume, difficulty urinating, discharge, dysuria, flank pain, frequency, penile pain, testicular pain and urgency.   Musculoskeletal: Negative for back pain, joint swelling, neck pain and neck stiffness.   Skin: Negative for color change and rash.   Allergic/Immunologic: Negative for immunocompromised state.   Neurological: Positive for weakness. Negative for dizziness, syncope, numbness and headaches.   Hematological: Does not bruise/bleed easily.   Psychiatric/Behavioral: Negative for agitation, behavioral problems and confusion.     Objective:     Vital Signs (Most Recent):  Temp: 97.6 °F (36.4 °C) (06/17/18 1845)  Pulse: 89 (06/17/18 1845)  Resp: 18 (06/17/18 1845)  BP: 135/82 (06/17/18 1845)  SpO2: 96 % (06/17/18 1845) Vital Signs (24h Range):  Temp:  [97.6 °F (36.4 °C)-98.4 °F (36.9 °C)]  97.6 °F (36.4 °C)  Pulse:  [84-89] 89  Resp:  [18-30] 18  SpO2:  [96 %-99 %] 96 %  BP: (128-137)/(77-93) 135/82     Weight: 86.8 kg (191 lb 6.4 oz)  Body mass index is 27.46 kg/m².    Physical Exam   Constitutional: He is oriented to person, place, and time. He appears well-developed and well-nourished. No distress.   Pleasant elderly male      HENT:   Head: Normocephalic and atraumatic.   Nose: Nose normal.   Eyes: Conjunctivae and EOM are normal. Pupils are equal, round, and reactive to light. No scleral icterus.   Neck: Normal range of motion. Neck supple. No tracheal deviation present.   Cardiovascular: Normal rate, regular rhythm, normal heart sounds and intact distal pulses.    No murmur heard.  Pulmonary/Chest: Effort normal and breath sounds normal. No stridor. No respiratory distress. He has no wheezes. He has no rales.   Abdominal: Soft. Bowel sounds are normal. He exhibits no distension. There is no tenderness. There is no guarding.   Genitourinary:   Genitourinary Comments: Check UA     Musculoskeletal: Normal range of motion. He exhibits no edema or deformity.   Neurological: He is alert and oriented to person, place, and time. No cranial nerve deficit or sensory deficit. He exhibits normal muscle tone. Coordination normal.   NIH 0     Skin: Skin is warm and dry. Capillary refill takes less than 2 seconds. No rash noted. He is not diaphoretic.   Psychiatric: He has a normal mood and affect. His behavior is normal. Judgment and thought content normal.   Nursing note and vitals reviewed.        CRANIAL NERVES     CN III, IV, VI   Pupils are equal, round, and reactive to light.  Extraocular motions are normal.        Significant Labs: All pertinent labs within the past 24 hours have been reviewed.  Results for orders placed or performed during the hospital encounter of 06/17/18   CBC W/ AUTO DIFFERENTIAL   Result Value Ref Range    WBC 5.60 3.90 - 12.70 K/uL    RBC 4.39 (L) 4.60 - 6.20 M/uL    Hemoglobin  12.6 (L) 14.0 - 18.0 g/dL    Hematocrit 38.5 (L) 40.0 - 54.0 %    MCV 88 82 - 98 fL    MCH 28.7 27.0 - 31.0 pg    MCHC 32.7 32.0 - 36.0 g/dL    RDW 14.3 11.5 - 14.5 %    Platelets 146 (L) 150 - 350 K/uL    MPV 11.7 9.2 - 12.9 fL    Gran # (ANC) 3.7 1.8 - 7.7 K/uL    Lymph # 1.0 1.0 - 4.8 K/uL    Mono # 0.6 0.3 - 1.0 K/uL    Eos # 0.3 0.0 - 0.5 K/uL    Baso # 0.03 0.00 - 0.20 K/uL    Gran% 65.5 38.0 - 73.0 %    Lymph% 17.9 (L) 18.0 - 48.0 %    Mono% 10.2 4.0 - 15.0 %    Eosinophil% 5.7 0.0 - 8.0 %    Basophil% 0.5 0.0 - 1.9 %    Differential Method Automated    Comprehensive metabolic panel   Result Value Ref Range    Sodium 138 136 - 145 mmol/L    Potassium 4.3 3.5 - 5.1 mmol/L    Chloride 106 95 - 110 mmol/L    CO2 24 23 - 29 mmol/L    Glucose 101 70 - 110 mg/dL    BUN, Bld 17 8 - 23 mg/dL    Creatinine 1.0 0.5 - 1.4 mg/dL    Calcium 8.8 8.7 - 10.5 mg/dL    Total Protein 6.6 6.0 - 8.4 g/dL    Albumin 3.6 3.5 - 5.2 g/dL    Total Bilirubin 0.6 0.1 - 1.0 mg/dL    Alkaline Phosphatase 60 55 - 135 U/L    AST 14 10 - 40 U/L    ALT 11 10 - 44 U/L    Anion Gap 8 8 - 16 mmol/L    eGFR if African American >60.0 >60 mL/min/1.73 m^2    eGFR if non African American >60.0 >60 mL/min/1.73 m^2   Protime-INR   Result Value Ref Range    Prothrombin Time 11.2 9.0 - 12.5 sec    INR 1.1 0.8 - 1.2   TSH   Result Value Ref Range    TSH 0.879 0.400 - 4.000 uIU/mL   APTT   Result Value Ref Range    aPTT 26.6 21.0 - 32.0 sec   POCT glucose   Result Value Ref Range    POCT Glucose 94 70 - 110 mg/dL       Significant Imaging: I have reviewed all pertinent imaging results/findings within the past 24 hours.   Imaging Results          X-Ray Chest AP Portable (Final result)  Result time 06/17/18 15:10:36    Final result by Samuel Martínez MD (06/17/18 15:10:36)                 Impression:      Stable cardiomegaly.  Tiny bilateral pleural effusions      Electronically signed by: Samuel Martínez MD  Date:    06/17/2018  Time:    15:10              Narrative:    EXAMINATION:  XR CHEST AP PORTABLE    CLINICAL HISTORY:  Stroke;    TECHNIQUE:  Single frontal view of the chest was performed.    COMPARISON:  04/04/2017    FINDINGS:  Stable cardiomegaly.  Tortuous aorta.    The lungs are clear.    Blunting bilateral costophrenic sulci.                               CT Head Without Contrast (Final result)  Result time 06/17/18 14:37:41    Final result by Samuel Martínez MD (06/17/18 14:37:41)                 Impression:      Negative for acute intracranial abnormality.    All CT scans at this facility use dose modulation, iterative reconstruction and/or weight based dosing when appropriate to reduce radiation dose to as low as reasonably achievable.      Electronically signed by: Samuel Martínez MD  Date:    06/17/2018  Time:    14:37             Narrative:    EXAMINATION:  CT HEAD WITHOUT CONTRAST    CLINICAL HISTORY:  Focal neuro deficit, new, fixed or worsening, <6 hours;    TECHNIQUE:  Axial CT images obtained throughout the head without intravenous contrast.    COMPARISON:  None.    FINDINGS:  Negative for acute hemorrhage, mass effect, extraaxial collection, hydrocephalus.    Mild chronic small vessel ischemic changes periventricular white matter.  Remote lacunar insult right basal ganglia.  Intracranial calcified plaque skullbase vasculature.    Moderate mucosal thickening throughout the ethmoids.    The calvarium is unremarkable with no fractures.

## 2018-06-18 NOTE — PLAN OF CARE
CM met with patient. Wife Uma at bedside. Discharge planning assessment completed. Patient is IADLs. Patient does not identify any D/C needs at this time. Payor:    [employer] MEDIC[employer] O       06/18/18 1157   Discharge Assessment   Assessment Type Discharge Planning Assessment   Confirmed/corrected address and phone number on facesheet? Yes   Assessment information obtained from? Patient   Communicated expected length of stay with patient/caregiver yes   Prior to hospitilization cognitive status: Alert/Oriented   Prior to hospitalization functional status: Independent   Current cognitive status: Alert/Oriented   Current Functional Status: Independent   Lives With spouse   Able to Return to Prior Arrangements yes   Is patient able to care for self after discharge? Yes   Who are your caregiver(s) and their phone number(s)? Uma Clark  spouse [phone]   Patient's perception of discharge disposition home or selfcare   Patient currently being followed by outpatient case management? Unable to determine (comments)   Patient currently receives any other outside agency services? No   Equipment Currently Used at Home nebulizer   Do you have any problems affording any of your prescribed medications? No   Is the patient taking medications as prescribed? yes   Does the patient have transportation home? Yes   Transportation Available family or friend will provide   Dialysis Name and Scheduled days N/A   Does the patient receive services at the [facility]? No   Discharge Plan A Home with family   Discharge Plan B Home with family   Patient/Family In Agreement With Plan yes

## 2018-06-18 NOTE — PLAN OF CARE
06/18/18 1154   GARCIA Message   Medicare Outpatient and Observation Notification regarding financial responsibility Given to patient/caregiver;Explained to patient/caregiver;Signed/date by patient/caregiver   Date GARCIA was signed 06/18/18   Time GARCIA was signed 1151

## 2018-07-16 ENCOUNTER — HOSPITAL ENCOUNTER (EMERGENCY)
Facility: HOSPITAL | Age: 73
Discharge: HOME | End: 2018-07-16
Attending: EMERGENCY MEDICINE
Payer: MEDICARE

## 2018-07-16 VITALS
DIASTOLIC BLOOD PRESSURE: 80 MMHG | TEMPERATURE: 98 F | OXYGEN SATURATION: 95 % | WEIGHT: 188.94 LBS | RESPIRATION RATE: 20 BRPM | HEIGHT: 70 IN | HEART RATE: 86 BPM | SYSTOLIC BLOOD PRESSURE: 134 MMHG | BODY MASS INDEX: 27.05 KG/M2

## 2018-07-16 DIAGNOSIS — R33.9 URINARY RETENTION: Primary | ICD-10-CM

## 2018-07-16 LAB
BACTERIA #/AREA URNS AUTO: NORMAL /HPF
BILIRUB UR QL STRIP: NEGATIVE
CLARITY UR REFRACT.AUTO: ABNORMAL
COLOR UR AUTO: YELLOW
GLUCOSE UR QL STRIP: NEGATIVE
HGB UR QL STRIP: ABNORMAL
KETONES UR QL STRIP: NEGATIVE
LEUKOCYTE ESTERASE UR QL STRIP: ABNORMAL
MICROSCOPIC COMMENT: NORMAL
NITRITE UR QL STRIP: NEGATIVE
PH UR STRIP: 6 [PH] (ref 5–8)
PROT UR QL STRIP: NEGATIVE
RBC #/AREA URNS AUTO: 1 /HPF (ref 0–4)
SP GR UR STRIP: <=1.005 (ref 1–1.03)
URN SPEC COLLECT METH UR: ABNORMAL
UROBILINOGEN UR STRIP-ACNC: NEGATIVE EU/DL
WBC #/AREA URNS AUTO: 5 /HPF (ref 0–5)

## 2018-07-16 PROCEDURE — 81000 URINALYSIS NONAUTO W/SCOPE: CPT

## 2018-07-16 PROCEDURE — 99283 EMERGENCY DEPT VISIT LOW MDM: CPT

## 2018-07-16 RX ORDER — CEFUROXIME AXETIL 500 MG/1
500 TABLET ORAL 2 TIMES DAILY
Qty: 20 TABLET | Refills: 0 | Status: SHIPPED | OUTPATIENT
Start: 2018-07-16 | End: 2018-07-26

## 2018-07-16 RX ORDER — TAMSULOSIN HYDROCHLORIDE 0.4 MG/1
0.4 CAPSULE ORAL DAILY
Qty: 30 CAPSULE | Refills: 0 | Status: SHIPPED | OUTPATIENT
Start: 2018-07-16 | End: 2019-07-16

## 2018-07-16 NOTE — ED PROVIDER NOTES
Encounter Date: 7/16/2018       History     Chief Complaint   Patient presents with    Urinary Retention     problems voiding yest. but today unable to void since this am.     The history is provided by the patient.   Male  Problem   Primary symptoms include no dysuria. Primary symptoms comment: Urinary retention. This is a recurrent problem. The current episode started today. The problem occurs constantly. The problem has been unchanged. Pertinent negatives include no nausea.     Review of patient's allergies indicates:   Allergen Reactions    Losartan-hydrochlorothiazide Shortness Of Breath     Angioedema    Ace inhibitors      Facial swelling    Amlodipine      swelling    Azithromycin      diarrhea    Nebivolol      Past Medical History:   Diagnosis Date    Angio-edema     Dougherty esophagus     COPD (chronic obstructive pulmonary disease)     GERD (gastroesophageal reflux disease)     Hyperlipemia     Hypertension     PLMD (periodic limb movement disorder)     Sensorineural deafness     Stroke     TIA (transient ischemic attack)      Past Surgical History:   Procedure Laterality Date    CYST REMOVAL      right facial cheek     HERNIA REPAIR       History reviewed. No pertinent family history.  Social History   Substance Use Topics    Smoking status: Former Smoker     Packs/day: 2.00     Years: 35.00     Types: Cigarettes     Quit date: 2/18/1978    Smokeless tobacco: Former User    Alcohol use No     Review of Systems   Constitutional: Negative for fever.   HENT: Negative for sore throat.    Respiratory: Negative for shortness of breath.    Cardiovascular: Negative for chest pain.   Gastrointestinal: Negative for nausea.   Genitourinary: Negative for dysuria.   Musculoskeletal: Negative for back pain.   Skin: Negative for rash.   Neurological: Negative for weakness.   Hematological: Does not bruise/bleed easily.       Physical Exam     Initial Vitals [07/16/18 1625]   BP Pulse Resp Temp  "SpO2   133/75 92 20 97.5 °F (36.4 °C) 96 %      MAP       --         Physical Exam    Nursing note and vitals reviewed.  Constitutional: He appears well-developed and well-nourished. He appears distressed (mild).   HENT:   Head: Normocephalic and atraumatic.   Mouth/Throat: Oropharynx is clear and moist.   Eyes: Conjunctivae and EOM are normal. Pupils are equal, round, and reactive to light.   Neck: Normal range of motion. Neck supple.   Cardiovascular: Normal rate, regular rhythm and normal heart sounds. Exam reveals no gallop and no friction rub.    No murmur heard.  Pulmonary/Chest: Breath sounds normal. No respiratory distress. He has no wheezes. He has no rhonchi. He has no rales.   Abdominal: Soft. Bowel sounds are normal. He exhibits no distension and no mass. There is no tenderness. There is no rebound and no guarding.   Musculoskeletal: Normal range of motion. He exhibits no edema.   Neurological: He is alert and oriented to person, place, and time. He has normal strength.   Skin: Skin is warm and dry. No rash noted.   Psychiatric: He has a normal mood and affect. Thought content normal.         ED Course   Procedures  Labs Reviewed   URINALYSIS, REFLEX TO URINE CULTURE - Abnormal; Notable for the following:        Result Value    Appearance, UA Hazy (*)     Specific Gravity, UA <=1.005 (*)     Occult Blood UA Trace (*)     Leukocytes, UA 2+ (*)     All other components within normal limits    Narrative:     Preferred Collection Type->Urine, Clean Catch   URINALYSIS MICROSCOPIC    Narrative:     Preferred Collection Type->Urine, Clean Catch          Imaging Results    None       ED Vital Signs:  Vitals:    07/16/18 1625   BP: 133/75   Pulse: 92   Resp: 20   Temp: 97.5 °F (36.4 °C)   TempSrc: Oral   SpO2: 96%   Weight: 85.7 kg (188 lb 15 oz)   Height: 5' 10" (1.778 m)         Abnormal Lab Results:  Labs Reviewed   URINALYSIS, REFLEX TO URINE CULTURE - Abnormal; Notable for the following:        Result Value "    Appearance, UA Hazy (*)     Specific Gravity, UA <=1.005 (*)     Occult Blood UA Trace (*)     Leukocytes, UA 2+ (*)     All other components within normal limits    Narrative:     Preferred Collection Type->Urine, Clean Catch   URINALYSIS MICROSCOPIC    Narrative:     Preferred Collection Type->Urine, Clean Catch          All Lab Results:  Results for orders placed or performed during the hospital encounter of 07/16/18   Urinalysis, Reflex to Urine Culture Urine, Clean Catch   Result Value Ref Range    Specimen UA Urine, Catheterized     Color, UA Yellow Yellow, Straw, Essence    Appearance, UA Hazy (A) Clear    pH, UA 6.0 5.0 - 8.0    Specific Gravity, UA <=1.005 (A) 1.005 - 1.030    Protein, UA Negative Negative    Glucose, UA Negative Negative    Ketones, UA Negative Negative    Bilirubin (UA) Negative Negative    Occult Blood UA Trace (A) Negative    Nitrite, UA Negative Negative    Urobilinogen, UA Negative <2.0 EU/dL    Leukocytes, UA 2+ (A) Negative   Urinalysis Microscopic   Result Value Ref Range    RBC, UA 1 0 - 4 /hpf    WBC, UA 5 0 - 5 /hpf    Bacteria, UA Occasional None-Occ /hpf    Microscopic Comment SEE COMMENT            Imaging Results:  Imaging Results    None            The Emergency Provider reviewed the vital signs and test results, which are outlined above.    ED Discussions:  5:48 PM: Reassessed pt at this time.  Pt states his condition has improved at this time. Discussed with pt all pertinent ED information and results. Discussed pt dx of urinary retention and plan of tx. Gave pt all f/u and return to the ED instructions. All questions and concerns were addressed at this time. Pt expresses understanding of information and instructions, and is comfortable with plan to discharge. Pt is stable for discharge.                                    Clinical Impression:       ICD-10-CM ICD-9-CM   1. Urinary retention R33.9 788.20           Disposition:   Disposition: Discharged  Condition:  Stable                        Phil Guerra MD  07/16/18 5002

## 2018-09-17 PROBLEM — Z13.1 DIABETES MELLITUS SCREENING: Status: RESOLVED | Noted: 2018-04-02 | Resolved: 2018-09-17

## 2018-10-23 ENCOUNTER — HOSPITAL ENCOUNTER (EMERGENCY)
Facility: HOSPITAL | Age: 73
Discharge: HOME | End: 2018-10-23
Attending: EMERGENCY MEDICINE
Payer: MEDICARE

## 2018-10-23 VITALS
SYSTOLIC BLOOD PRESSURE: 126 MMHG | OXYGEN SATURATION: 97 % | DIASTOLIC BLOOD PRESSURE: 72 MMHG | RESPIRATION RATE: 16 BRPM | WEIGHT: 186.06 LBS | HEART RATE: 60 BPM | BODY MASS INDEX: 26.7 KG/M2 | TEMPERATURE: 98 F

## 2018-10-23 DIAGNOSIS — T78.3XXA ANGIOEDEMA, INITIAL ENCOUNTER: Primary | ICD-10-CM

## 2018-10-23 PROCEDURE — 96372 THER/PROPH/DIAG INJ SC/IM: CPT

## 2018-10-23 PROCEDURE — 63600175 PHARM REV CODE 636 W HCPCS: Performed by: EMERGENCY MEDICINE

## 2018-10-23 PROCEDURE — 99284 EMERGENCY DEPT VISIT MOD MDM: CPT | Mod: 25

## 2018-10-23 RX ORDER — DEXAMETHASONE SODIUM PHOSPHATE 4 MG/ML
8 INJECTION, SOLUTION INTRA-ARTICULAR; INTRALESIONAL; INTRAMUSCULAR; INTRAVENOUS; SOFT TISSUE
Status: COMPLETED | OUTPATIENT
Start: 2018-10-23 | End: 2018-10-23

## 2018-10-23 RX ORDER — EPINEPHRINE 0.3 MG/.3ML
1 INJECTION SUBCUTANEOUS
Qty: 2 EACH | Refills: 1 | Status: SHIPPED | OUTPATIENT
Start: 2018-10-23 | End: 2019-10-23

## 2018-10-23 RX ORDER — ALBUTEROL SULFATE 0.83 MG/ML
2.5 SOLUTION RESPIRATORY (INHALATION) EVERY 6 HOURS PRN
COMMUNITY
Start: 2018-07-19 | End: 2019-07-19

## 2018-10-23 RX ORDER — FUROSEMIDE 20 MG/1
20 TABLET ORAL
COMMUNITY
Start: 2018-09-20

## 2018-10-23 RX ORDER — METOPROLOL SUCCINATE 25 MG/1
25 TABLET, EXTENDED RELEASE ORAL
COMMUNITY
Start: 2018-08-17 | End: 2019-08-17

## 2018-10-23 RX ORDER — CHOLECALCIFEROL (VITAMIN D3) 25 MCG
2000 TABLET ORAL
COMMUNITY

## 2018-10-23 RX ORDER — ATORVASTATIN CALCIUM 40 MG/1
40 TABLET, FILM COATED ORAL
COMMUNITY
Start: 2018-08-24 | End: 2019-02-20

## 2018-10-23 RX ADMIN — DEXAMETHASONE SODIUM PHOSPHATE 8 MG: 4 INJECTION, SOLUTION INTRAMUSCULAR; INTRAVENOUS at 01:10

## 2018-10-23 NOTE — ED PROVIDER NOTES
Encounter Date: 10/23/2018       History     Chief Complaint   Patient presents with    Oral Swelling     onset of tongue swelling pta     He has had occasional episodes of angioedema over many years.  He has already been taken off of any Ace inhibitor or angiotensin receptor blocker, but episodes have continued on a sporadic basis.  No specific trigger has been identified.  He saw Dr. Llanes of immunology and allergy in the past, last time was probably 4 or 5 years ago.  Testing was done, no specific etiology determined.  Episodes of generally involved his lips, cheeks, submental area, and neck.  Today, he is having spontaneous onset tongue swelling without other symptoms. Review of current and recent history does not reveal any likely etiology for an allergic trigger.  No dyspnea, rash, or other complaints. He normally wears dentures only on the upper part of his mouth.      The history is provided by the patient and the spouse. No  was used.     Review of patient's allergies indicates:   Allergen Reactions    Losartan-hydrochlorothiazide Shortness Of Breath     Angioedema    Ace inhibitors      Facial swelling    Amlodipine      swelling    Azithromycin      diarrhea    Nebivolol      Past Medical History:   Diagnosis Date    Angio-edema     Dougherty esophagus     COPD (chronic obstructive pulmonary disease)     GERD (gastroesophageal reflux disease)     Hyperlipemia     Hypertension     PLMD (periodic limb movement disorder)     Sensorineural deafness     Stroke     TIA (transient ischemic attack)      Past Surgical History:   Procedure Laterality Date    CYST REMOVAL      right facial cheek     HERNIA REPAIR       History reviewed. No pertinent family history.  Social History     Tobacco Use    Smoking status: Former Smoker     Packs/day: 2.00     Years: 35.00     Pack years: 70.00     Types: Cigarettes     Last attempt to quit: 1978     Years since quittin.7     Smokeless tobacco: Former User   Substance Use Topics    Alcohol use: No     Alcohol/week: 0.0 oz    Drug use: No     Review of Systems   Constitutional: Negative for chills and fever.   HENT: Negative for congestion, facial swelling, nosebleeds and sinus pressure.         Tongue swelling   Eyes: Negative for pain and redness.   Respiratory: Negative for chest tightness, shortness of breath and wheezing.    Cardiovascular: Negative for chest pain, palpitations and leg swelling.   Gastrointestinal: Negative for abdominal distention, abdominal pain, diarrhea, nausea and vomiting.   Endocrine: Negative for cold intolerance, polydipsia and polyphagia.   Genitourinary: Negative for difficulty urinating, dysuria, frequency and hematuria.   Musculoskeletal: Negative for arthralgias, back pain, myalgias and neck pain.   Skin: Negative for color change and rash.   Neurological: Negative for dizziness, weakness, numbness and headaches.   Hematological: Negative for adenopathy. Does not bruise/bleed easily.   Psychiatric/Behavioral: Negative for agitation and behavioral problems.   All other systems reviewed and are negative.      Physical Exam     Initial Vitals [10/23/18 1316]   BP Pulse Resp Temp SpO2   121/79 67 16 98 °F (36.7 °C) 95 %      MAP       --         Physical Exam    Nursing note and vitals reviewed.  Constitutional: He appears well-developed and well-nourished. He is not diaphoretic. No distress.   HENT:   Head: Normocephalic and atraumatic.   Mouth/Throat: Oropharynx is clear and moist. No oropharyngeal exudate.   Edentulous.  Upper dentures in place.  Moderate angioedema involves the tongue and sublingual area in a symmetric fashion.  Airway is clear.  Speech is almost normal. No other findings.   Eyes: Conjunctivae and EOM are normal. Pupils are equal, round, and reactive to light. Right eye exhibits no discharge. Left eye exhibits no discharge. No scleral icterus.   Neck: Normal range of motion. Neck  supple. No thyromegaly present. No tracheal deviation present. No JVD present.   Cardiovascular: Normal rate, regular rhythm and normal heart sounds. Exam reveals no gallop and no friction rub.    No murmur heard.  Pulmonary/Chest: Breath sounds normal. No respiratory distress. He has no wheezes. He has no rhonchi. He has no rales. He exhibits no tenderness.   Abdominal: Soft. Bowel sounds are normal. He exhibits no distension and no mass. There is no tenderness. There is no rebound and no guarding.   Musculoskeletal: Normal range of motion. He exhibits no edema or tenderness.   Lymphadenopathy:     He has no cervical adenopathy.   Neurological: He is alert and oriented to person, place, and time. He has normal strength. No cranial nerve deficit.   Skin: Skin is warm and dry. No rash noted. No erythema.   Psychiatric: He has a normal mood and affect. His behavior is normal. Judgment and thought content normal.         ED Course   Procedures  Labs Reviewed - No data to display       Imaging Results    None       2:57 PM No change. Recommend return visit to Dr. 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                            Clinical Impression:     1. Angioedema, initial encounter          Disposition:   Disposition: Discharged  Condition: Stable                        Collins Chauhan MD  10/23/18 5316

## 2018-10-23 NOTE — ED NOTES
Reports previous episodes of facial swelling. Allergist relates to blood pressure medication use. Denies new meds, foods, irritants. Speaks slowly, denies difficulty swallowing or breathing.

## 2018-10-23 NOTE — DISCHARGE INSTRUCTIONS
_______________    As discussed - I recommend that you go back to see Dr. Llanes for repeat evaluation of your recurring angioedema.    Return to the ER as needed.    ______________

## 2020-12-29 ENCOUNTER — LAB VISIT (OUTPATIENT)
Dept: PRIMARY CARE CLINIC | Facility: OTHER | Age: 75
End: 2020-12-29
Attending: INTERNAL MEDICINE
Payer: MEDICARE

## 2020-12-29 DIAGNOSIS — Z03.818 ENCOUNTER FOR OBSERVATION FOR SUSPECTED EXPOSURE TO OTHER BIOLOGICAL AGENTS RULED OUT: Primary | ICD-10-CM

## 2020-12-29 PROCEDURE — U0003 INFECTIOUS AGENT DETECTION BY NUCLEIC ACID (DNA OR RNA); SEVERE ACUTE RESPIRATORY SYNDROME CORONAVIRUS 2 (SARS-COV-2) (CORONAVIRUS DISEASE [COVID-19]), AMPLIFIED PROBE TECHNIQUE, MAKING USE OF HIGH THROUGHPUT TECHNOLOGIES AS DESCRIBED BY CMS-2020-01-R: HCPCS

## 2020-12-30 LAB — SARS-COV-2 RNA RESP QL NAA+PROBE: NOT DETECTED

## 2022-06-08 ENCOUNTER — HOSPITAL ENCOUNTER (OUTPATIENT)
Dept: RADIOLOGY | Facility: HOSPITAL | Age: 77
Discharge: HOME | End: 2022-06-08
Attending: NURSE PRACTITIONER
Payer: MEDICARE

## 2022-06-08 DIAGNOSIS — M79.10 MYALGIA: ICD-10-CM

## 2022-06-08 DIAGNOSIS — M79.10 MYALGIA: Primary | ICD-10-CM

## 2022-06-08 PROCEDURE — 76536 US SOFT TISSUE HEAD NECK THYROID: ICD-10-PCS | Mod: 26,,, | Performed by: RADIOLOGY

## 2022-06-08 PROCEDURE — 70220 X-RAY EXAM OF SINUSES: CPT | Mod: TC,PO

## 2022-06-08 PROCEDURE — 76536 US EXAM OF HEAD AND NECK: CPT | Mod: 26,,, | Performed by: RADIOLOGY

## 2022-06-08 PROCEDURE — 76536 US EXAM OF HEAD AND NECK: CPT | Mod: TC,PO

## 2022-06-08 PROCEDURE — 70220 XR SINUSES MIN 3 VIEWS: ICD-10-PCS | Mod: 26,,, | Performed by: RADIOLOGY

## 2022-06-08 PROCEDURE — 70220 X-RAY EXAM OF SINUSES: CPT | Mod: 26,,, | Performed by: RADIOLOGY

## 2023-09-25 ENCOUNTER — HOSPITAL ENCOUNTER (OUTPATIENT)
Dept: RADIOLOGY | Facility: HOSPITAL | Age: 78
Discharge: HOME | End: 2023-09-25
Attending: INTERNAL MEDICINE
Payer: MEDICARE

## 2023-09-25 DIAGNOSIS — M10.9 GOUT, UNSPECIFIED: ICD-10-CM

## 2023-09-25 DIAGNOSIS — M10.9 GOUT, UNSPECIFIED: Primary | ICD-10-CM

## 2023-09-25 PROCEDURE — 73610 X-RAY EXAM OF ANKLE: CPT | Mod: 26,RT,, | Performed by: RADIOLOGY

## 2023-09-25 PROCEDURE — 73610 X-RAY EXAM OF ANKLE: CPT | Mod: TC,PO,RT

## 2023-09-25 PROCEDURE — 73610 XR ANKLE COMPLETE 3 VIEW RIGHT: ICD-10-PCS | Mod: 26,RT,, | Performed by: RADIOLOGY

## 2024-11-06 ENCOUNTER — APPOINTMENT (RX ONLY)
Dept: URBAN - METROPOLITAN AREA CLINIC 166 | Facility: CLINIC | Age: 79
Setting detail: DERMATOLOGY
End: 2024-11-06

## 2024-11-06 DIAGNOSIS — L72.8 OTHER FOLLICULAR CYSTS OF THE SKIN AND SUBCUTANEOUS TISSUE: ICD-10-CM

## 2024-11-06 DIAGNOSIS — L30.9 DERMATITIS, UNSPECIFIED: ICD-10-CM

## 2024-11-06 PROCEDURE — ? INTRALESIONAL KENALOG

## 2024-11-06 PROCEDURE — 11900 INJECT SKIN LESIONS </W 7: CPT

## 2024-11-06 PROCEDURE — ? PRESCRIPTION

## 2024-11-06 PROCEDURE — ? PRESCRIPTION MEDICATION MANAGEMENT

## 2024-11-06 PROCEDURE — ? COUNSELING

## 2024-11-06 PROCEDURE — 99203 OFFICE O/P NEW LOW 30 MIN: CPT | Mod: 25

## 2024-11-06 RX ORDER — TRIAMCINOLONE ACETONIDE 1 MG/G
CREAM TOPICAL BID
Qty: 15 | Refills: 1 | Status: ERX | COMMUNITY
Start: 2024-11-06

## 2024-11-06 RX ADMIN — TRIAMCINOLONE ACETONIDE: 1 CREAM TOPICAL at 00:00

## 2024-11-06 ASSESSMENT — LOCATION SIMPLE DESCRIPTION DERM: LOCATION SIMPLE: RIGHT EAR

## 2024-11-06 ASSESSMENT — LOCATION ZONE DERM: LOCATION ZONE: EAR

## 2024-11-06 ASSESSMENT — LOCATION DETAILED DESCRIPTION DERM
LOCATION DETAILED: RIGHT ANTIHELIX
LOCATION DETAILED: RIGHT SUPERIOR CRUS OF ANTIHELIX

## 2024-11-06 NOTE — PROCEDURE: INTRALESIONAL KENALOG
Show Inventory Tab: Hide
Include Z78.9 (Other Specified Conditions Influencing Health Status) As An Associated Diagnosis?: No
How Many Mls Were Removed From The 10 Mg/Ml (5ml) Vial When Preparing The Injectable Solution?: 0
Concentration Of Kenalog Solution Injected (Mg/Ml): 10.0
Ndc# For Kenalog Only: 7742-8870-21
Medical Necessity Clause: This procedure was medically necessary because the lesions that were treated were:
Total Volume (Ccs): 0.3
Kenalog Preparation: Kenalog
Validate Note Data When Using Inventory: Yes
Consent: The risks of atrophy were reviewed with the patient.
Kenalog Type Of Vial: Multiple Dose
Detail Level: Simple

## 2024-11-06 NOTE — PROCEDURE: PRESCRIPTION MEDICATION MANAGEMENT
Detail Level: Zone
Plan: Triamcinolone cream twice a day for 2 weeks on, 1 week off then repeat. Follow up in 6 weeks.
Render In Strict Bullet Format?: No